# Patient Record
Sex: FEMALE | Race: WHITE | Employment: OTHER | URBAN - METROPOLITAN AREA
[De-identification: names, ages, dates, MRNs, and addresses within clinical notes are randomized per-mention and may not be internally consistent; named-entity substitution may affect disease eponyms.]

---

## 2017-01-09 ENCOUNTER — TELEPHONE ANTICOAG (OUTPATIENT)
Dept: CARDIOLOGY CLINIC | Age: 72
End: 2017-01-09

## 2017-01-09 DIAGNOSIS — Z79.01 LONG TERM CURRENT USE OF ANTICOAGULANT THERAPY: ICD-10-CM

## 2017-01-09 LAB — INR, EXTERNAL: 4.4

## 2017-01-09 NOTE — PROGRESS NOTES
Verbal order and read back per Dinora Talbot NP  The INR is above the therapeutic range. Ask the patient about bleeding complications. Please make the following adjustments to Coumadin dosing: Hold x 2 days then resume previous dosing schedule.  Recheck INR in 2 weeks   No answer, left instructions on voicemail and asked patient to call office to verify receipt of message. Andrzej Varela LPN      Message  Received:  Today       MANINDER Wilson LPN       Caller: Unspecified (Today, 12:44 PM)                      Hold x 2 days then resume previous dosing schedule.  Recheck INR in 2 weeks

## 2017-01-13 ENCOUNTER — HOSPITAL ENCOUNTER (OUTPATIENT)
Dept: LAB | Age: 72
Discharge: HOME | End: 2017-01-13
Payer: MEDICARE

## 2017-01-13 LAB
ALBUMIN SERPL BCP-MCNC: 3.8 G/DL (ref 3.4–5)
ALBUMIN/GLOB SERPL: 1.5 {RATIO} (ref 0.8–1.7)
ALP SERPL-CCNC: 65 U/L (ref 45–117)
ALT SERPL-CCNC: 27 U/L (ref 13–56)
ANION GAP BLD CALC-SCNC: 11 MMOL/L (ref 3–18)
AST SERPL W P-5'-P-CCNC: 17 U/L (ref 15–37)
BASOPHILS # BLD AUTO: 0 K/UL (ref 0–0.06)
BASOPHILS # BLD: 1 % (ref 0–2)
BILIRUB SERPL-MCNC: 0.6 MG/DL (ref 0.2–1)
BUN SERPL-MCNC: 17 MG/DL (ref 7–18)
BUN/CREAT SERPL: 18 (ref 12–20)
CALCIUM SERPL-MCNC: 9.2 MG/DL (ref 8.5–10.1)
CHLORIDE SERPL-SCNC: 104 MMOL/L (ref 100–108)
CHOLEST SERPL-MCNC: 154 MG/DL
CO2 SERPL-SCNC: 28 MMOL/L (ref 21–32)
CREAT SERPL-MCNC: 0.95 MG/DL (ref 0.6–1.3)
DIFFERENTIAL METHOD BLD: ABNORMAL
EOSINOPHIL # BLD: 0.1 K/UL (ref 0–0.4)
EOSINOPHIL NFR BLD: 3 % (ref 0–5)
ERYTHROCYTE [DISTWIDTH] IN BLOOD BY AUTOMATED COUNT: 13.7 % (ref 11.6–14.5)
GLOBULIN SER CALC-MCNC: 2.6 G/DL (ref 2–4)
GLUCOSE SERPL-MCNC: 98 MG/DL (ref 74–99)
HCT VFR BLD AUTO: 41.9 % (ref 35–45)
HDLC SERPL-MCNC: 55 MG/DL (ref 40–60)
HDLC SERPL: 2.8 {RATIO} (ref 0–5)
HGB BLD-MCNC: 13.6 G/DL (ref 12–16)
LDLC SERPL CALC-MCNC: 75 MG/DL (ref 0–100)
LIPID PROFILE,FLP: NORMAL
LYMPHOCYTES # BLD AUTO: 43 % (ref 21–52)
LYMPHOCYTES # BLD: 1.9 K/UL (ref 0.9–3.6)
MCH RBC QN AUTO: 30.1 PG (ref 24–34)
MCHC RBC AUTO-ENTMCNC: 32.5 G/DL (ref 31–37)
MCV RBC AUTO: 92.7 FL (ref 74–97)
MONOCYTES # BLD: 0.5 K/UL (ref 0.05–1.2)
MONOCYTES NFR BLD AUTO: 11 % (ref 3–10)
NEUTS SEG # BLD: 1.8 K/UL (ref 1.8–8)
NEUTS SEG NFR BLD AUTO: 42 % (ref 40–73)
PLATELET # BLD AUTO: 196 K/UL (ref 135–420)
PMV BLD AUTO: 11.4 FL (ref 9.2–11.8)
POTASSIUM SERPL-SCNC: 4.1 MMOL/L (ref 3.5–5.5)
PROT SERPL-MCNC: 6.4 G/DL (ref 6.4–8.2)
RBC # BLD AUTO: 4.52 M/UL (ref 4.2–5.3)
SODIUM SERPL-SCNC: 143 MMOL/L (ref 136–145)
TRIGL SERPL-MCNC: 120 MG/DL (ref ?–150)
VLDLC SERPL CALC-MCNC: 24 MG/DL
WBC # BLD AUTO: 4.3 K/UL (ref 4.6–13.2)

## 2017-01-13 PROCEDURE — 80061 LIPID PANEL: CPT | Performed by: FAMILY MEDICINE

## 2017-01-13 PROCEDURE — 36415 COLL VENOUS BLD VENIPUNCTURE: CPT | Performed by: FAMILY MEDICINE

## 2017-01-13 PROCEDURE — 80053 COMPREHEN METABOLIC PANEL: CPT | Performed by: FAMILY MEDICINE

## 2017-01-13 PROCEDURE — 85025 COMPLETE CBC W/AUTO DIFF WBC: CPT | Performed by: FAMILY MEDICINE

## 2017-01-23 LAB — INR, EXTERNAL: 2.3

## 2017-01-25 ENCOUNTER — TELEPHONE ANTICOAG (OUTPATIENT)
Dept: CARDIOLOGY CLINIC | Age: 72
End: 2017-01-25

## 2017-01-25 DIAGNOSIS — Z79.01 LONG TERM CURRENT USE OF ANTICOAGULANT THERAPY: ICD-10-CM

## 2017-01-25 NOTE — PROGRESS NOTES
Verbal order and read back per Sahara Santos NP  The INR is stable and therapeutic.  Continue same dose of coumadin and recheck in 2 weeks  Continue Coumadin to 2 mg daily except 1mg every other Mon  Patient informed of instructions, read back and verbalized understanding Hatch, LPN

## 2017-02-06 LAB — INR, EXTERNAL: 2.3

## 2017-02-06 RX ORDER — METOPROLOL SUCCINATE 25 MG/1
TABLET, EXTENDED RELEASE ORAL
Qty: 60 TAB | Refills: 6 | Status: SHIPPED | OUTPATIENT
Start: 2017-02-06 | End: 2017-05-13 | Stop reason: SDUPTHER

## 2017-02-07 ENCOUNTER — TELEPHONE ANTICOAG (OUTPATIENT)
Dept: CARDIOLOGY CLINIC | Age: 72
End: 2017-02-07

## 2017-02-07 DIAGNOSIS — Z79.01 LONG TERM CURRENT USE OF ANTICOAGULANT THERAPY: ICD-10-CM

## 2017-02-07 NOTE — PROGRESS NOTES
Verbal order and read back per Lakeisha Morel NP  The INR is stable and therapeutic. Continue same dose of coumadin and recheck in 2 weeks  Continue Coumadin 2 mg daily except 1 mg every other Mon  No answer, left instructions on voicemail and asked patient to call office to verify receipt of message.  Augusto Agudelo, LPN

## 2017-02-21 LAB — INR, EXTERNAL: 2.5

## 2017-02-23 ENCOUNTER — TELEPHONE ANTICOAG (OUTPATIENT)
Dept: CARDIOLOGY CLINIC | Age: 72
End: 2017-02-23

## 2017-02-23 DIAGNOSIS — Z79.01 LONG TERM CURRENT USE OF ANTICOAGULANT THERAPY: ICD-10-CM

## 2017-02-23 NOTE — PROGRESS NOTES
Verbal order and read back per Sunshine Curtis NP  The INR is stable and therapeutic. Continue same dose of coumadin and recheck in 2 weeks  Continue Coumadin 2 mg daily except 1 mg every other Mon  No answer, left instructions on voicemail and asked patient to call office to verify receipt of message.  Elena Bradford LPN

## 2017-03-06 LAB — INR, EXTERNAL: 2.4

## 2017-03-08 ENCOUNTER — TELEPHONE ANTICOAG (OUTPATIENT)
Dept: CARDIOLOGY CLINIC | Age: 72
End: 2017-03-08

## 2017-03-08 DIAGNOSIS — Z79.01 LONG TERM CURRENT USE OF ANTICOAGULANT THERAPY: ICD-10-CM

## 2017-03-10 ENCOUNTER — OFFICE VISIT (OUTPATIENT)
Dept: CARDIOLOGY CLINIC | Age: 72
End: 2017-03-10

## 2017-03-10 DIAGNOSIS — I11.9 BENIGN HYPERTENSIVE HEART DISEASE WITHOUT HEART FAILURE: ICD-10-CM

## 2017-03-10 DIAGNOSIS — E78.5 DYSLIPIDEMIA: ICD-10-CM

## 2017-03-10 DIAGNOSIS — Z95.1 S/P CABG X 1: ICD-10-CM

## 2017-03-10 DIAGNOSIS — Z79.01 LONG TERM CURRENT USE OF ANTICOAGULANT THERAPY: ICD-10-CM

## 2017-03-10 DIAGNOSIS — I25.10 ATHEROSCLEROSIS OF NATIVE CORONARY ARTERY OF NATIVE HEART WITHOUT ANGINA PECTORIS: Primary | ICD-10-CM

## 2017-03-10 NOTE — PROGRESS NOTES
Review of Systems   Constitutional: Positive for weight loss. Respiratory: Negative. Cardiovascular: Positive for palpitations. Gastrointestinal: Positive for constipation. Musculoskeletal: Positive for back pain, joint pain, myalgias and neck pain.

## 2017-03-10 NOTE — PROGRESS NOTES
1. Have you been to the ER, urgent care clinic since your last visit? Hospitalized since your last visit? no  2. Have you seen or consulted any other health care providers outside of the [address] [place] [address] since your last visit?   Include any pap smears or colon screening no

## 2017-03-10 NOTE — PROGRESS NOTES
Review of Systems   Constitutional: Positive for weight loss. Respiratory: Negative. Cardiovascular: Positive for chest pain and palpitations. Gastrointestinal: Positive for constipation. Musculoskeletal: Positive for back pain, joint pain, myalgias and neck pain.

## 2017-03-10 NOTE — MR AVS SNAPSHOT
Visit Information Date & Time Provider Department Dept. Phone Encounter #  
 3/10/2017 11:20 AM Serina Chacko DO [facility] [id number] Follow-up Instructions Return in about 6 months (around 9/10/2017), or if symptoms worsen or fail to improve. Upcoming Health Maintenance Date Due Hepatitis C Screening 1945 DTaP/Tdap/Td series (1 - Tdap) 4/27/1967 FOBT Q 1 YEAR AGE 50-75 4/27/1996 ZOSTER VACCINE AGE 60> 4/27/2006 GLAUCOMA SCREENING Q2Y 4/27/2011 Pneumococcal 65+ Low/Medium Risk (1 of 2 - PCV13) 4/27/2011 MEDICARE YEARLY EXAM 4/27/2011 INFLUENZA AGE 9 TO ADULT 8/1/2016 BREAST CANCER SCRN MAMMOGRAM 1/15/2018 Allergies as of 3/10/2017  Review Complete On: 3/10/2017 By: Serina Chacko DO Severity Noted Reaction Type Reactions Codeine High  Intolerance Nausea Only Crestor [Rosuvastatin]  07/16/2014    Myalgia Lipitor [Atorvastatin]  07/16/2014   Intolerance Myalgia High dose only Other Medication   Intolerance Other (comments) Unable to tolerate any pain medications due to severe GI upset Penicillins   Intolerance Rash And swelling Current Immunizations  Never Reviewed No immunizations on file. Not reviewed this visit You Were Diagnosed With   
  
 Codes Comments Atherosclerosis of native coronary artery of native heart without angina pectoris    -  Primary ICD-10-CM: I25.10 ICD-9-CM: 414.01 S/P CABG x 1     ICD-10-CM: Z95.1 ICD-9-CM: V45.81 Dyslipidemia     ICD-10-CM: E78.5 ICD-9-CM: 272.4 Benign hypertensive heart disease without heart failure     ICD-10-CM: I11.9 ICD-9-CM: 402.10 Vitals BP Pulse Height(growth percentile) Weight(growth percentile) SpO2 BMI  
 (!) 172/94 (!) 48 5' 6\" (1.676 m) 230 lb (104.3 kg) 97% 37.12 kg/m2 OB Status Smoking Status Postmenopausal Never Smoker Vitals History BMI and BSA Data Body Mass Index Body Surface Area  
 37.12 kg/m 2 2.2 m 2 Preferred Pharmacy Pharmacy Name Phone [facility] [address] [place] [facility] [phone] Your Updated Medication List  
  
   
This list is accurate as of: 3/10/17 12:52 PM.  Always use your most recent med list.  
  
  
  
  
 amitriptyline 10 mg tablet Commonly known as:  ELAVIL Take 10 mg by mouth nightly. LIPITOR 40 mg tablet Generic drug:  atorvastatin Take 40 mg by mouth daily. lisinopril-hydroCHLOROthiazide 10-12.5 mg per tablet Commonly known as:  Floyce Plenty Take 1 Tab by mouth daily. metoprolol succinate 25 mg XL tablet Commonly known as:  TOPROL XL  
TAKE ONE TABLET BY MOUTH TWICE DAILY PriLOSEC 20 mg capsule Generic drug:  omeprazole Take 20 mg by mouth daily. traMADol 50 mg tablet Commonly known as:  ULTRAM  
Take 50 mg by mouth as needed for Pain. VITAMIN C PO Take 1 Tab by mouth Three (3) times a week. VITAMIN D 2,000 unit Cap capsule Generic drug:  Cholecalciferol (Vitamin D3) Take 1 Tab by mouth daily. * warfarin 2 mg tablet Commonly known as:  COUMADIN Take 1 and 1/2 tablets by mouth once daily or as directed by physicain * warfarin 2 mg tablet Commonly known as:  COUMADIN  
TAKE 1 AND 1/2 TABLETS BY MOUTH ONCE DAILY OR AS DIRECTED BY PHYSICIAN. * Notice: This list has 2 medication(s) that are the same as other medications prescribed for you. Read the directions carefully, and ask your doctor or other care provider to review them with you. We Performed the Following AMB POC EKG ROUTINE W/ 12 LEADS, INTER & REP [90809 CPT(R)] Follow-up Instructions Return in about 6 months (around 9/10/2017), or if symptoms worsen or fail to improve. To-Do List   
 03/10/2017 ECHO:  ECHO TTE STRESS EXERCISE TREADMILL COMP   
  
 03/10/2017 ECHO:  ECHO TTE STRESS EXRCSE COMP W OR WO CONTR   
  
 03/14/2017 2:30 PM  
  Appointment with [facility] NUC CARD ROOM; [facility]- IE33 MACHINE (WT ) at [facility] NON-INVASIVE CARD ([phone]) Age Limit for ALL Heart procedures @ all [facility] facilities: 18 yrs and older only. Under the age of 25, refer to [facility] ([phone]). PATIENTS SHOULD NOT BRING CHILDREN UNATTENDED TO APPTS. WEIGHT LIMIT:  300 lbs for the treadmill portion of this study. 1-NPO and no caffeine for 4 hours prior to the test 2-No beta blockers or calcium channel blockers day of the test unless specified by cardiology. Please bring with. 3-Patient will be walking/running on a Treadmill. Patient should wear comfortable shoes that are suitable for walking (NO Sandals/Flip Flops, High Heels, etc.) & comfortable clothing. Please report to the [facility] @ [facility], [address] [place] [address], [address], [employer], [place]. Introducing [facility] & HEALTH SERVICES! Dhruv Dotson introduces [employer] patient portal. Now you can access parts of your medical record, email your doctor's office, and request medication refills online. 1. In your internet browser, go to https://[employer]. [employer] 2. Click on the First Time User? Click Here link in the Sign In box. You will see the New Member Sign Up page. 3. Enter your [employer] Access Code exactly as it appears below. You will not need to use this code after youve completed the sign-up process. If you do not sign up before the expiration date, you must request a new code. · [employer] Access Code: [email or web address] Expires: 6/6/2017  1:34 PM 
 
4. Enter the last four digits of your Social Security Number (xxxx) and Date of Birth (mm/dd/yyyy) as indicated and click Submit. You will be taken to the next sign-up page. 5. Create a [employer] ID. This will be your [employer] login ID and cannot be changed, so think of one that is secure and easy to remember. 6. Create a [employer] password. You can change your password at any time. 7. Enter your Password Reset Question and Answer. This can be used at a later time if you forget your password. 8. Enter your e-mail address. You will receive e-mail notification when new information is available in [address] 9. Click Sign Up. You can now view and download portions of your medical record. 10. Click the Download Summary menu link to download a portable copy of your medical information. If you have questions, please visit the Frequently Asked Questions section of the [employer] website. Remember, [employer] is NOT to be used for urgent needs. For medical emergencies, dial 911. Now available from your iPhone and Android! Please provide this summary of care documentation to your next provider. Your primary care clinician is listed as JOSE CARLOS GEORGE. If you have any questions after today's visit, please call [phone].

## 2017-03-10 NOTE — PROGRESS NOTES
HPI: I saw Serge Oksana Gaona in my office today in cardiovascular evaluation regarding her chronic coronary artery disease. Ms. Beena Gaona is a very pleasant 80year old white female with history of coronary artery disease, status post coronary artery bypass grafting surgery with left internal mammary artery to the LAD done as a midcab procedure after a failed angioplasty in 1996. She has done reasonably well on medical therapy over the years. Her last screening nuclear myocardial perfusion study in July of 2014 demonstrated some distal inferolateral and apical akinesia, but still fairly normal overall left ventricular function with ejection fraction of 62% and no signs of any ongoing ischemia. She comes into the office today and relates that she is doing reasonably well. She does have some sharp left-sided chest discomfort which she has at various times and intermittently with exertion that is the same as it has been for many years. This has been felt to be non-cardiac or due to atypical angina with the pattern of the discomforts been quite stable for a number of years. She is also complaining of occasional palpitations which have not changed in frequency and denies any other cardiovascular symptomatology. Encounter Diagnoses   Name Primary?  Atherosclerosis of native coronary artery of native heart without angina pectoris Yes    S/P CABG x 1 in 1996     Dyslipidemia     Hypertensive heart disease      Long term current use of anticoagulant therapy        Discussion: This lady appears to be doing about as well as we could expect and really have no recommendations for change at this time. The pain that she has been describing she has had almost since her open heart surgery and has not been felt to be anginal and is unchanged. She otherwise seems to be doing reasonably well and I do not feel we need to repeat her pharmacologic myocardial perfusion study at this time.     Her latest lipid profile which was completed on January 13, 2017 demonstrated total cholesterol 154 with triglycerides of 120, HDL 55, LDL 75, and VLDL of 24 which I think is reasonable control on her Lipitor 40 mg daily. A case could be made for more aggressive treatment with Lipitor, but this was completed right around the holiday season and a rather check her level in July and then decide on adjustment of her medications at that time if it is still shows an LDL above 70. Her blood pressure is elevated today and I rechecked it myself and got 175/95 which was quite similar to the blood pressure obtained by my staff initially. She currently is only on Toprol, lisinopril and hydrochlorothiazide and I have recommended that she check on her pressure either at home or through Dr. Francheska Manning office for further adjustment of her medications moving forward to get her blood pressure adequately controlled under 835 systolic. Since she is otherwise doing well I will plan to see her again in several months or as needed if new cardiovascular symptoms surface in the interim. PCP: Kindra Carl MD       Past Medical History:   Diagnosis Date    Abnormal myocardial perfusion study 07/07/2014    Mid to distal inferolateral & apical fixed defect c/w prior infarction. No ongoing ischemia. EF 62%. Distal inferolateral & apical akin. Rate-dependent LBBB & occasional PAC & PVCs w/exercise. Ex time 3 min 46 sec.        Arthritis     Broken wrist     fractured elbow also    Crush injury 9/23/09    fall w/ secondary crush injury to a portion of the elbow w/ subsequent surgery; frozen shoulder     CVA (cerebral vascular accident) ([address]     hx of 2 small CVAs    Disc disease, degenerative, cervical     Heart abnormality     Heart disease     atherosclerotic; s/p MIDCAB in 1996 w/ a lt internal mammary artery to the LAD, which was patent and nuclear study in 2007, demonstrating some apical scar w/o ischemia, for medical therapy    History of cardiac cath 06/17/2004    Small RCA - mild. LM - mild. LAD - large aneurysmal segment. Mild LAD and D2. LIMA ok. EF 45-50%. Anterolateral/apical hyk to dysk.  History of echocardiogram 07/17/2015    EF 50-55%. Apical dysk. Mild LVH  Gr 1 DDfx. No significant valvular heart disease.  Hypercholesterolemia     Hypertension     Hypertensive cardiovascular disease     LBP (low back pain)     Lower extremity venous duplex 07/01/2004    No DVT bilaterally. Patent R SFA w/o pseudoaneurysm.  Musculoskeletal chest pain     Reflux     S/P CABG x 1 09/14/96    LIMA-LAD     Stroke [facility]) 3001/0197         Past Surgical History:   Procedure Laterality Date    CARDIAC SURG PROCEDURE UNLIST      open heart surgery    HX CORONARY ARTERY BYPASS GRAFT  1996    lt internal mammary artery to lt anterior descending after a failed angioplasty    HX GYN      removal of left ovarian tumor four years ago    HX ORTHOPAEDIC  9/08    pinning of fracture in rt forearm    HX OTHER SURGICAL      bilateral TMJ surgery    HX OTHER SURGICAL  10/31/15    vagina biopsy of lesions         Current Outpatient Rx   Name  Route  Sig  Dispense  Refill    warfarin (COUMADIN) 2 mg tablet        Take 1 and 1/2 tablets by mouth once daily or as directed by physicain    45 Tab    5      metoprolol succinate (TOPROL XL) 25 mg XL tablet    Oral    Take 1 Tab by mouth two (2) times a day. 60 Tab    6      ezetimibe (ZETIA) 10 mg tablet    Oral    Take 10 mg by mouth daily.  traMADol (ULTRAM) 50 mg tablet    Oral    Take 50 mg by mouth as needed for Pain.  amitriptyline (ELAVIL) 10 mg tablet    Oral    Take 10 mg by mouth nightly.  atorvastatin (LIPITOR) 40 mg tablet    Oral    Take 40 mg by mouth daily.  lisinopril-hydrochlorothiazide (PRINZIDE, ZESTORETIC) 10-12.5 mg per tablet    Oral    Take 1 Tab by mouth daily.                 Cholecalciferol, Vitamin D3, (VITAMIN D) 2,000 unit Cap    Oral    Take 1 Tab by mouth daily.  omeprazole (PRILOSEC) 20 mg capsule    Oral    Take 20 mg by mouth daily.  ASCORBATE CALCIUM (VITAMIN C PO)    Oral    Take 1 Tab by mouth Three (3) times a week. Allergies   Allergen Reactions    Codeine Nausea Only    Crestor [Rosuvastatin] Myalgia    Lipitor [Atorvastatin] Myalgia     High dose only    Other Medication Other (comments)     Unable to tolerate any pain medications due to severe GI upset    Penicillins Rash     And swelling         Social   Social History   Substance Use Topics    Smoking status: Never Smoker    Smokeless tobacco: Never Used    Alcohol use No         Family history: family history includes Cancer in her father and mother. Review of Systems:    Constitutional: Positive for weight loss. Respiratory: Negative. Cardiovascular: Positive for chest pain and palpitations. Gastrointestinal: Positive for constipation. Musculoskeletal: Positive for back pain, joint pain, myalgias and neck pain. Physical Exam:   The patient is an alert, oriented, well developed, well nourished 80 y.o.  female who was in no acute distress at the time of my examination. Visit Vitals    BP (!) 175/95    Pulse (!) 48    Ht 5' 6\" (1.676 m)    Wt 104.3 kg (230 lb)    SpO2 97%    BMI 37.12 kg/m2      BP Readings from Last 3 Encounters:   03/13/17 (!) 175/95   09/01/16 130/74   02/24/16 118/72        Wt Readings from Last 3 Encounters:   03/10/17 104.3 kg (230 lb)   09/01/16 107 kg (236 lb)   02/24/16 104.3 kg (230 lb)       HEENT: Conjuctiva white, mucosa moist, no pallor or cyanosis. Neck: Supple without masses, tenderness or thyromegaly. No jugular venous distention. Carotid upstrokes are full bilaterally, without bruits. Cardiovascular: Chest is symmetrical with good excursion. Pendulous breasts.   [place] is not displaced with a discreetormal, without appreciable murmurs, rubs, clicks or gallops. Lungs: Clear to auscultation in all fields. Abdomen: Soft. No masses, tenderness or organomegaly. Extremities: Mild edema in her ankles with full peripheral pulses. Review of Data: Please refer to past medical history for most recent cardiac testing. Lab Results   Component Value Date/Time    Cholesterol, total 154 01/13/2017 11:25 AM    HDL Cholesterol 55 01/13/2017 11:25 AM    LDL, calculated 75 01/13/2017 11:25 AM    Triglyceride 120 01/13/2017 11:25 AM    CHOL/HDL Ratio 2.8 01/13/2017 11:25 AM       Results for orders placed or performed in visit on 03/10/17   AMB POC EKG ROUTINE W/ 12 LEADS, INTER & REP     Status: None    Narrative    Sinus bradycardia, rate 48. First-degree AV block. Possible old anterior infarction with questionable Q waves in V1 through V3. Diffuse ST-T flattening with some mild T-wave inversions anteriorly which could suggest ischemia. Compared to the EKG of September 1, 2016 there was no significant change of the deep Q-wave in lead V3 is new and may potentially be related to lead placement variation. Vane Melton D.O., F.A.C.C. [facility]  [facility] [place]  [address] [place] [address]. [address] [facility]  [phone]     PLEASE NOTE:  This document has been produced using voice recognition software. Unrecognized errors in transcription may be present.

## 2017-03-13 VITALS
HEIGHT: 66 IN | DIASTOLIC BLOOD PRESSURE: 95 MMHG | BODY MASS INDEX: 36.96 KG/M2 | SYSTOLIC BLOOD PRESSURE: 175 MMHG | HEART RATE: 48 BPM | WEIGHT: 230 LBS | OXYGEN SATURATION: 97 %

## 2017-03-14 ENCOUNTER — HOSPITAL ENCOUNTER (OUTPATIENT)
Dept: NON INVASIVE DIAGNOSTICS | Age: 72
Discharge: HOME | End: 2017-03-14
Attending: INTERNAL MEDICINE

## 2017-03-14 DIAGNOSIS — I25.10 ATHEROSCLEROSIS OF NATIVE CORONARY ARTERY OF NATIVE HEART WITHOUT ANGINA PECTORIS: ICD-10-CM

## 2017-03-14 DIAGNOSIS — Z95.1 S/P CABG X 1: ICD-10-CM

## 2017-03-14 DIAGNOSIS — I25.10 ATHEROSCLEROSIS OF NATIVE CORONARY ARTERY OF NATIVE HEART WITHOUT ANGINA PECTORIS: Primary | ICD-10-CM

## 2017-03-15 ENCOUNTER — HOSPITAL ENCOUNTER (OUTPATIENT)
Dept: NON INVASIVE DIAGNOSTICS | Age: 72
Discharge: HOME | End: 2017-03-15
Attending: INTERNAL MEDICINE
Payer: MEDICARE

## 2017-03-15 DIAGNOSIS — I25.10 ATHEROSCLEROSIS OF NATIVE CORONARY ARTERY OF NATIVE HEART WITHOUT ANGINA PECTORIS: ICD-10-CM

## 2017-03-15 DIAGNOSIS — Z95.1 S/P CABG X 1: ICD-10-CM

## 2017-03-15 LAB
ATTENDING PHYSICIAN, CST07: NORMAL
DIAGNOSIS, 93000: NORMAL
DUKE TM SCORE RESULT, CST14: NORMAL
DUKE TREADMILL SCORE, CST13: NORMAL
ECG INTERP BEFORE EX, CST11: NORMAL
ECG INTERP DURING EX, CST12: NORMAL
FUNCTIONAL CAPACITY, CST17: NORMAL
KNOWN CARDIAC CONDITION, CST08: NORMAL
MAX. DIASTOLIC BP, CST04: 90 MMHG
MAX. HEART RATE, CST05: 114 BPM
MAX. SYSTOLIC BP, CST03: 160 MMHG
OVERALL BP RESPONSE TO EXERCISE, CST16: NORMAL
OVERALL HR RESPONSE TO EXERCISE, CST15: NORMAL
PEAK EX METS, CST10: 1 METS
PROTOCOL NAME, CST01: NORMAL
TEST INDICATION, CST09: NORMAL

## 2017-03-15 PROCEDURE — 93017 CV STRESS TEST TRACING ONLY: CPT | Performed by: INTERNAL MEDICINE

## 2017-03-15 PROCEDURE — 74011250636 HC RX REV CODE- 250/636: Performed by: INTERNAL MEDICINE

## 2017-03-15 PROCEDURE — 78452 HT MUSCLE IMAGE SPECT MULT: CPT | Performed by: INTERNAL MEDICINE

## 2017-03-15 PROCEDURE — A9500 TC99M SESTAMIBI: HCPCS

## 2017-03-15 RX ORDER — SODIUM CHLORIDE 0.9 % (FLUSH) 0.9 %
10 SYRINGE (ML) INJECTION AS NEEDED
Status: COMPLETED | OUTPATIENT
Start: 2017-03-15 | End: 2017-03-15

## 2017-03-15 RX ADMIN — Medication 10 ML: at 08:30

## 2017-03-15 RX ADMIN — REGADENOSON 0.4 MG: 0.08 INJECTION, SOLUTION INTRAVENOUS at 08:30

## 2017-03-15 RX ADMIN — Medication 10 ML: at 07:10

## 2017-03-15 NOTE — PROGRESS NOTES
Patient was injected with 65.7 millicuries 34QUR Sestamibi on 3/15/17 at 0710. Patient was injected with 60.9 millicuries 13CVK Sestamibi on  3/15/17 at 0830. Patient's armbands were removed and placed in shred-it box.     Patient had a Nuclear Lexiscan Stress Test.

## 2017-03-20 LAB — INR, EXTERNAL: 2.3

## 2017-03-21 NOTE — PROCEDURES
[address] [facility] CARDIAC STRESS    Name:  Naa Akers  MR#:  [id number]  :  1945  Account #:  [de-identified]  Date of Adm:  03/15/2017  Date of Service:      ORDERING PHYSICIAN: Dr. Anshul Bonner    INDICATIONS: Coronary artery disease, history of bypass surgery. Resting heart rate 68, blood pressure 160/88. Resting EKG shows  sinus rhythm with a left bundle branch block. PHARMACOLOGIC STRESS PORTION: The patient underwent  Lexiscan infusion 0.4 mg intravenously per protocol via the left arm IV  and then sat still because of the bundle branch block. The patient remained in bundle branch block, making  this a non-diagnostic EKG portion of the stress test.    PERFUSION IMAGING: The patient received intravenous technetium-  99m sestamibi 11.0 mCi via the left hand IV for resting images and  then 33.0 mCi via the same IV an hour later for stress images. Tomographic views of the left ventricle obtained and compared. Cavity  size was normal with both rest and stress imaging. There was a  medium size, primarily fixed perfusion defect involving the majority of  the left ventricular apex, which is more prominent on the inferior portion  of the apex and also the distal portion of the inferolateral wall,  consistent with prior myocardial infarction. There were no reversible  perfusion imaging abnormalities concerning for myocardial ischemia. Gated analysis demonstrates normal LV size, mildly depressed left  ventricular systolic function, ejection fraction calculated at 46% with  akinesis of the inferior portion of the apex and also the distal  inferolateral wall. There was mild septal wall dyskinesis likely from  underlying bundle branch block and previous sternotomy. CONCLUSIONS:  1.  Abnormal, intermediate risk pharmacologic nuclear stress test.  2. Medium-sized fixed defect involving the majority of the left  ventricular apex and distal inferolateral wall consistent with  prior myocardial infarction. 3. There are no reversible perfusion defects concerning for  myocardial ischemia. 4. Normal left ventricular size, mildly depressed left ventricular systolic  function, ejection fraction calculated at 46% with above-mentioned  regional wall motion abnormalities. 5. Compared to previous study date 07/07/2014, the perfusion pattern  appears unchanged, the ejection fraction has decreased from 62% to  now 46%.         MD BOB Wayne / Effie Power  D:  03/21/2017   07:39  T:  03/21/2017   10:20  Job #:  [id number]

## 2017-03-22 RX ORDER — WARFARIN 2 MG/1
TABLET ORAL
Qty: 45 TAB | Refills: 6 | Status: SHIPPED | OUTPATIENT
Start: 2017-03-22 | End: 2017-09-27 | Stop reason: SDUPTHER

## 2017-03-23 ENCOUNTER — TELEPHONE ANTICOAG (OUTPATIENT)
Dept: CARDIOLOGY CLINIC | Age: 72
End: 2017-03-23

## 2017-03-23 DIAGNOSIS — Z79.01 LONG TERM CURRENT USE OF ANTICOAGULANT THERAPY: ICD-10-CM

## 2017-03-23 NOTE — PROGRESS NOTES
Verbal order and read back per Cecilia Scott, DO  The INR is stable and therapeutic. Continue same dose of coumadin and recheck in 2 weeks  Continue Coumadin 2 mg daily except 1 mg every other Mon  No answer, left instructions on voicemail and asked patient to call office to verify receipt of message.  Charlotte Luo LPN

## 2017-03-27 ENCOUNTER — TELEPHONE (OUTPATIENT)
Dept: CARDIOLOGY CLINIC | Age: 72
End: 2017-03-27

## 2017-03-27 NOTE — TELEPHONE ENCOUNTER
----- Message from Herlinda Dakin, RN sent at 3/22/2017 11:21 AM EDT -----  Think this was ordered to clear her for surgery.

## 2017-03-27 NOTE — TELEPHONE ENCOUNTER
I called and discussed the results of the study with the patient. Her nuclear myocardial perfusion study simply demonstrated an apical scar which she has had for many years and was documented a cardiac catheterization by Dr. Anthony Lynch back in 2004. Her overall left ventricular function was estimated at 46% and this was similar to what Dr. Anthony Lynch had suggested on left ventriculography with ejection fraction of 45-50%. She did have a nuclear myocardial fissure perfusion study in 2014 suggesting ejection fraction of 62%, but that was done with the patient having a lot of PACs and I think that caused a falsely increased ejection fraction. Consequently, everything seems to be stable and she is to continue her medical therapy as currently ordered.  ES

## 2017-04-03 LAB — INR, EXTERNAL: 2.6

## 2017-04-06 ENCOUNTER — TELEPHONE ANTICOAG (OUTPATIENT)
Dept: CARDIOLOGY CLINIC | Age: 72
End: 2017-04-06

## 2017-04-06 DIAGNOSIS — Z79.01 LONG TERM CURRENT USE OF ANTICOAGULANT THERAPY: ICD-10-CM

## 2017-04-14 ENCOUNTER — TELEPHONE ANTICOAG (OUTPATIENT)
Dept: CARDIOLOGY CLINIC | Age: 72
End: 2017-04-14

## 2017-04-14 DIAGNOSIS — Z79.01 LONG TERM CURRENT USE OF ANTICOAGULANT THERAPY: ICD-10-CM

## 2017-04-17 LAB — INR, EXTERNAL: 2.3

## 2017-04-18 ENCOUNTER — TELEPHONE (OUTPATIENT)
Dept: CARDIOLOGY CLINIC | Age: 72
End: 2017-04-18

## 2017-04-18 NOTE — TELEPHONE ENCOUNTER
Verbal order and read back per Sol Restrepo, DO  Pt is moderate risk for Release of Left 1st Dorsal Extensor Compartment with Dr. Dougherty Flair on 5/1/2017 bridge with Lovenox      Hold coumadin starting on 4/26/2017 start Lovenox on 4/28/2017 (8am and 8pm) BID, no Lovenox 12 hr prior to the procedure, restart Lovenox after procedure, restart coumadin the day after the procedure 4mg then back to normal schedule and INR in one week.

## 2017-04-19 ENCOUNTER — TELEPHONE ANTICOAG (OUTPATIENT)
Dept: CARDIOLOGY CLINIC | Age: 72
End: 2017-04-19

## 2017-04-19 DIAGNOSIS — Z79.01 LONG TERM CURRENT USE OF ANTICOAGULANT THERAPY: ICD-10-CM

## 2017-04-19 NOTE — PROGRESS NOTES
Verbal order and read back per Izzy Valdez NP  The INR is stable and therapeutic. Continue same dose of coumadin and recheck in 2 weeks  Continue Coumadin 2 mg daily except 1 mg every other Mon   Patient informed of instructions, read back and verbalized understanding Vinny Allen LPN    Patient states she rescheduled procedure for June 7th. Encounter dated 4/18/17 stated patient is cleared for surgery but will bridge with Lovenox.   Ardyth Edge, LPN

## 2017-05-01 LAB — INR, EXTERNAL: 2.5

## 2017-05-02 ENCOUNTER — TELEPHONE ANTICOAG (OUTPATIENT)
Dept: CARDIOLOGY CLINIC | Age: 72
End: 2017-05-02

## 2017-05-02 DIAGNOSIS — Z79.01 LONG TERM CURRENT USE OF ANTICOAGULANT THERAPY: ICD-10-CM

## 2017-05-02 NOTE — PROGRESS NOTES
Verbal order and read back per Trini Vanegas NP  The INR is stable and therapeutic.  Continue same dose of coumadin and recheck in 2 weeks  Continue Coumadin 2 mg daily except 1 mg every other Mon   Patient informed of instructions, read back and verbalized understanding Mariola Laurent LPN

## 2017-05-15 RX ORDER — METOPROLOL SUCCINATE 25 MG/1
TABLET, EXTENDED RELEASE ORAL
Qty: 60 TAB | Refills: 5 | Status: SHIPPED | OUTPATIENT
Start: 2017-05-15 | End: 2018-01-13 | Stop reason: SDUPTHER

## 2017-05-16 ENCOUNTER — TELEPHONE ANTICOAG (OUTPATIENT)
Dept: CARDIOLOGY CLINIC | Age: 72
End: 2017-05-16

## 2017-05-16 DIAGNOSIS — Z79.01 LONG TERM CURRENT USE OF ANTICOAGULANT THERAPY: ICD-10-CM

## 2017-05-16 LAB — INR, EXTERNAL: 3.9

## 2017-05-16 NOTE — PROGRESS NOTES
Verbal order and read back per Mirela Padilla NP  The INR is above the therapeutic range. Ask the patient about bleeding complications.   Please make the following adjustments to Coumadin dosing: Hold X 1, 1 mg Wed and Fri while on abx then Continue Coumadin 2 mg daily except 1 mg every other Mon   Repeat the INR in 1 week  Patient informed of instructions, read back and verbalized understanding Harvinder Jha LPN

## 2017-05-23 ENCOUNTER — TELEPHONE ANTICOAG (OUTPATIENT)
Dept: CARDIOLOGY CLINIC | Age: 72
End: 2017-05-23

## 2017-05-23 DIAGNOSIS — Z79.01 LONG TERM CURRENT USE OF ANTICOAGULANT THERAPY: ICD-10-CM

## 2017-05-23 LAB — INR, EXTERNAL: 3.5

## 2017-05-23 NOTE — PROGRESS NOTES
Verbal order and read back per Luca Hudson NP  The INR is above the therapeutic range. Ask the patient about bleeding complications. Please make the following adjustments to Coumadin dosing: Hold X 1, 1 mg X 2 then Continue Coumadin 2 mg daily except 1 mg every other Mon   Repeat the INR in 1 week.   Patient informed of instructions, read back and verbalized understanding Karen Joshi LPN

## 2017-06-12 ENCOUNTER — HOSPITAL ENCOUNTER (OUTPATIENT)
Dept: GENERAL RADIOLOGY | Age: 72
Discharge: HOME | End: 2017-06-12
Payer: MEDICARE

## 2017-06-12 DIAGNOSIS — M54.6 PAIN IN THORACIC SPINE: ICD-10-CM

## 2017-06-12 DIAGNOSIS — M54.2 CERVICALGIA: ICD-10-CM

## 2017-06-12 LAB — INR, EXTERNAL: 3.1

## 2017-06-12 PROCEDURE — 72070 X-RAY EXAM THORAC SPINE 2VWS: CPT

## 2017-06-12 PROCEDURE — 72050 X-RAY EXAM NECK SPINE 4/5VWS: CPT

## 2017-06-13 ENCOUNTER — TELEPHONE ANTICOAG (OUTPATIENT)
Dept: CARDIOLOGY CLINIC | Age: 72
End: 2017-06-13

## 2017-06-13 DIAGNOSIS — Z79.01 LONG TERM CURRENT USE OF ANTICOAGULANT THERAPY: ICD-10-CM

## 2017-06-13 NOTE — PROGRESS NOTES
Verbal order and read back per Shae Middleton NP  The INR is slightly above the therapeutic range. Ask the patient about bleeding complications. Please make the following adjustments to Coumadin dosing: Change Coumadin to 2 mg daily except 1 mg on Mon   Repeat the INR in 2 weeks.   Patient informed of instructions, read back and verbalized understanding Anthony Lucero LPN

## 2017-06-19 LAB — INR, EXTERNAL: 2.7

## 2017-06-22 ENCOUNTER — TELEPHONE ANTICOAG (OUTPATIENT)
Dept: CARDIOLOGY CLINIC | Age: 72
End: 2017-06-22

## 2017-06-22 DIAGNOSIS — Z79.01 LONG TERM CURRENT USE OF ANTICOAGULANT THERAPY: ICD-10-CM

## 2017-07-05 LAB — INR, EXTERNAL: 2.5

## 2017-07-07 ENCOUNTER — TELEPHONE ANTICOAG (OUTPATIENT)
Dept: CARDIOLOGY CLINIC | Age: 72
End: 2017-07-07

## 2017-07-07 DIAGNOSIS — Z79.01 LONG TERM CURRENT USE OF ANTICOAGULANT THERAPY: ICD-10-CM

## 2017-07-07 NOTE — PROGRESS NOTES
Verbal order and read back per Sofia Sawyer NP  The INR is stable and therapeutic. Continue same dose of coumadin and recheck in 2 weeks  Continue Coumadin 2 mg daily except 1 mg on Mon   No answer, left instructions on voicemail and asked patient to call office to verify receipt of message.  Tanner Crockett LPN

## 2017-07-18 LAB — INR, EXTERNAL: 2.8

## 2017-07-31 ENCOUNTER — HOSPITAL ENCOUNTER (OUTPATIENT)
Dept: MAMMOGRAPHY | Age: 72
Discharge: HOME | End: 2017-07-31
Attending: SPECIALIST
Payer: MEDICARE

## 2017-07-31 DIAGNOSIS — Z12.31 VISIT FOR SCREENING MAMMOGRAM: ICD-10-CM

## 2017-07-31 PROCEDURE — 77063 BREAST TOMOSYNTHESIS BI: CPT

## 2017-08-02 LAB — INR, EXTERNAL: 2.2

## 2017-08-03 ENCOUNTER — TELEPHONE ANTICOAG (OUTPATIENT)
Dept: CARDIOLOGY CLINIC | Age: 72
End: 2017-08-03

## 2017-08-03 DIAGNOSIS — Z79.01 LONG TERM CURRENT USE OF ANTICOAGULANT THERAPY: ICD-10-CM

## 2017-08-03 NOTE — PROGRESS NOTES
Verbal order and read back per Courtney Suarez NP  The INR is stable and therapeutic.  Continue same dose of coumadin and recheck in 2 weeks  Continue Coumadin 2 mg daily except 1 mg on Mon   Patient informed of instructions, read back and verbalized understanding Onelia Chan LPN

## 2017-08-17 LAB — INR, EXTERNAL: 2.3

## 2017-08-18 ENCOUNTER — TELEPHONE ANTICOAG (OUTPATIENT)
Dept: CARDIOLOGY CLINIC | Age: 72
End: 2017-08-18

## 2017-08-18 DIAGNOSIS — Z79.01 LONG TERM CURRENT USE OF ANTICOAGULANT THERAPY: ICD-10-CM

## 2017-08-18 NOTE — PROGRESS NOTES
Verbal order and read back per Oj Ramirez MD  The INR is stable and therapeutic. Continue same dose of coumadin and recheck in 2 weeks  Continue Coumadin 2 mg daily except 1 mg on Mon   No answer, left instructions on voicemail and asked patient to call office to verify receipt of message.  Jaycob Dacosta LPN

## 2017-09-02 LAB — INR, EXTERNAL: 3.5

## 2017-09-05 ENCOUNTER — TELEPHONE ANTICOAG (OUTPATIENT)
Dept: CARDIOLOGY CLINIC | Age: 72
End: 2017-09-05

## 2017-09-05 DIAGNOSIS — Z79.01 LONG TERM CURRENT USE OF ANTICOAGULANT THERAPY: ICD-10-CM

## 2017-09-05 RX ORDER — KRILL/OM-3/DHA/EPA/PHOSPHO/AST 1000-230MG
CAPSULE ORAL
COMMUNITY
End: 2018-03-09

## 2017-09-05 NOTE — PROGRESS NOTES
Verbal order and read back per Mable Hughes NP  The INR is above the therapeutic range. Ask the patient about bleeding complications. Please make the following adjustments to Coumadin dosing: Hold X 1 then Continue Coumadin 2 mg daily except 1 mg on Mon   Repeat the INR in 2 weeks.   No answer, left instructions on voicemail and asked patient to call office to verify receipt of message and any changes in medication, diet, etc. Lesly Bland LPN

## 2017-09-27 ENCOUNTER — OFFICE VISIT (OUTPATIENT)
Dept: CARDIOLOGY CLINIC | Age: 72
End: 2017-09-27

## 2017-09-27 VITALS
SYSTOLIC BLOOD PRESSURE: 124 MMHG | BODY MASS INDEX: 37.61 KG/M2 | WEIGHT: 234 LBS | HEART RATE: 59 BPM | DIASTOLIC BLOOD PRESSURE: 82 MMHG | HEIGHT: 66 IN | OXYGEN SATURATION: 97 %

## 2017-09-27 DIAGNOSIS — Z95.1 S/P CABG X 1: ICD-10-CM

## 2017-09-27 DIAGNOSIS — I25.10 ATHEROSCLEROSIS OF NATIVE CORONARY ARTERY OF NATIVE HEART WITHOUT ANGINA PECTORIS: Primary | ICD-10-CM

## 2017-09-27 DIAGNOSIS — E78.5 DYSLIPIDEMIA: ICD-10-CM

## 2017-09-27 DIAGNOSIS — I11.9 BENIGN HYPERTENSIVE HEART DISEASE WITHOUT HEART FAILURE: ICD-10-CM

## 2017-09-27 DIAGNOSIS — I44.7 LBBB (LEFT BUNDLE BRANCH BLOCK): ICD-10-CM

## 2017-09-27 NOTE — MR AVS SNAPSHOT
Visit Information Date & Time Provider Department Dept. Phone Encounter #  
 9/27/2017 11:00 AM Rita Nick DO [facility] [id number] Follow-up Instructions Return in about 6 months (around 3/27/2018), or if symptoms worsen or fail to improve. Your Appointments 4/16/2018 11:20 AM  
Follow Up with Rita Nick DO [facility] ([facility]) Appt Note: 6 month f/up [place] [id number] [address] [address] [phone] [address] [place] [address] [address] Upcoming Health Maintenance Date Due Hepatitis C Screening 1945 DTaP/Tdap/Td series (1 - Tdap) 4/27/1967 FOBT Q 1 YEAR AGE 50-75 4/27/1996 ZOSTER VACCINE AGE 60> 2/27/2006 GLAUCOMA SCREENING Q2Y 4/27/2011 Pneumococcal 65+ Low/Medium Risk (1 of 2 - PCV13) 4/27/2011 MEDICARE YEARLY EXAM 4/27/2011 INFLUENZA AGE 9 TO ADULT 8/1/2017 BREAST CANCER SCRN MAMMOGRAM 7/31/2019 Allergies as of 9/27/2017  Review Complete On: 9/27/2017 By: Rita Nick DO Severity Noted Reaction Type Reactions Codeine High  Intolerance Nausea Only Crestor [Rosuvastatin]  07/16/2014    Myalgia Lipitor [Atorvastatin]  07/16/2014   Intolerance Myalgia High dose only Other Medication   Intolerance Other (comments) Unable to tolerate any pain medications due to severe GI upset Penicillins   Intolerance Rash And swelling Current Immunizations  Never Reviewed No immunizations on file. Not reviewed this visit You Were Diagnosed With   
  
 Codes Comments Atherosclerosis of native coronary artery of native heart without angina pectoris    -  Primary ICD-10-CM: I25.10 ICD-9-CM: 414.01 S/P CABG x 1     ICD-10-CM: Z95.1 ICD-9-CM: V45.81 Dyslipidemia     ICD-10-CM: E78.5 ICD-9-CM: 272.4 Benign hypertensive heart disease without heart failure     ICD-10-CM: I11.9 ICD-9-CM: 402.10 LBBB (left bundle branch block)     ICD-10-CM: I44.7 ICD-9-CM: 426. 3 Vitals BP Pulse Height(growth percentile) Weight(growth percentile) SpO2 BMI  
 124/82 (!) 59 5' 6\" (1.676 m) 234 lb (106.1 kg) 97% 37.77 kg/m2 OB Status Smoking Status Postmenopausal Never Smoker BMI and BSA Data Body Mass Index Body Surface Area  
 37.77 kg/m 2 2.22 m 2 Preferred Pharmacy Pharmacy Name Phone [facility] [address] [place] [facility] [phone] Your Updated Medication List  
  
   
This list is accurate as of: 9/27/17 12:23 PM.  Always use your most recent med list.  
  
  
  
  
 amitriptyline 10 mg tablet Commonly known as:  ELAVIL Take 10 mg by mouth nightly. LIPITOR 40 mg tablet Generic drug:  atorvastatin Take 40 mg by mouth daily. lisinopril-hydroCHLOROthiazide 10-12.5 mg per tablet Commonly known as:  Fleet He Take 1 Tab by mouth daily. MEGARED OMEGA-3 KRILL OIL 1,000-230-60 mg Cap Generic drug:  krill-om-3-dha-epa-phospho-ast  
Take  by mouth. PriLOSEC 20 mg capsule Generic drug:  omeprazole Take 20 mg by mouth daily. TOPROL XL 25 mg XL tablet Generic drug:  metoprolol succinate TAKE 1 TABLET BY MOUTH TWICE DAILY  
  
 traMADol 50 mg tablet Commonly known as:  ULTRAM  
Take 50 mg by mouth as needed for Pain. VITAMIN C PO Take 1 Tab by mouth Three (3) times a week. VITAMIN D 2,000 unit Cap capsule Generic drug:  Cholecalciferol (Vitamin D3) Take 1 Tab by mouth daily. warfarin 2 mg tablet Commonly known as:  COUMADIN Take 1 and 1/2 tablets by mouth once daily or as directed by physicain We Performed the Following AMB POC EKG ROUTINE W/ 12 LEADS, INTER & REP [55611 CPT(R)] Follow-up Instructions Return in about 6 months (around 3/27/2018), or if symptoms worsen or fail to improve. Introducing [facility]! Mildred Sheets introduces [employer] patient portal. Now you can access parts of your medical record, email your doctor's office, and request medication refills online. 1. In your internet browser, go to [email or web address] 2. Click on the First Time User? Click Here link in the Sign In box. You will see the New Member Sign Up page. 3. Enter your S[employer] Access Code exactly as it appears below. You will not need to use this code after youve completed the sign-up process. If you do not sign up before the expiration date, you must request a new code. · [employer] Access Code: [facility] Expires: 12/26/2017 11:03 AM 
 
4. Enter the last four digits of your Social Security Number (xxxx) and Date of Birth (mm/dd/yyyy) as indicated and click Submit. You will be taken to the next sign-up page. 5. Create a [employer] ID. This will be your [employer] login ID and cannot be changed, so think of one that is secure and easy to remember. 6. Create a [employer] password. You can change your password at any time. 7. Enter your Password Reset Question and Answer. This can be used at a later time if you forget your password. 8. Enter your e-mail address. You will receive e-mail notification when new information is available in [address] 9. Click Sign Up. You can now view and download portions of your medical record. 10. Click the Download Summary menu link to download a portable copy of your medical information. If you have questions, please visit the Frequently Asked Questions section of the [employer] website. Remember, [employer] is NOT to be used for urgent needs. For medical emergencies, dial 911. Now available from your iPhone and Android! Please provide this summary of care documentation to your next provider. Your primary care clinician is listed as JOSE CARLOS GEORGE. If you have any questions after today's visit, please call [phone].

## 2017-09-27 NOTE — PROGRESS NOTES
HPI:  I saw Vivian CastanedaKhanh Ram in my office today in cardiovascular evaluation regarding her chronic coronary artery disease. Ms. Kristen Ram is a very pleasant 71year old white female with history of coronary artery disease, status post coronary artery bypass grafting surgery with left internal mammary artery to the LAD done as a midcab procedure after a failed angioplasty in 1996. She has done reasonably well on medical therapy over the years. Her last screening nuclear myocardial perfusion study in July of 2014 demonstrated some distal inferolateral and apical akinesia, but still fairly normal overall left ventricular function with ejection fraction of 62% and no signs of any ongoing ischemia. She comes to the office today and relates that she has little lower extremity edema and occasional palpitations as well as her mild chronic anginal symptoms which she has described over the years which are rather infrequent and are not worsening. She does have some rare palpitations which are poorly described but denies any other cardiovascular complaints. Encounter Diagnoses   Name Primary?  Atherosclerosis of native coronary artery of native heart without angina pectoris Yes    S/P CABG x 1 in 1996     Dyslipidemia     Hypertensive heart disease      LBBB (left bundle branch block)        Discussion: This lady appears to be doing about as well as we could expect and really of no recommendations for change at this time. She is not having any symptoms to suggest development of worsening of her underlying chronic coronary artery disease or heart failure symptomatology. Her latest lipid profile that I have a copy of was done in January 2017 showed total cholesterol 154 with triglycerides of 120, HDL of 55, LDL of 75, and VLDL 24 which is fair control on Lipitor 40 mg daily.   I would like to see somewhat better with an LDL under 70 does have myalgias with higher doses of Lipitor so I think were going to have to leave her on her present regimen. She has developed a chronic left bundle branch block since I last saw her in the office on March 10, 2017 but she is not having any new cardiovascular symptoms and she has had the bundle branch block intermittently in the past. We did do a nuclear myocardial perfusion study on her back on March 15, 2017 which demonstrated a medium sized defect involving the majority of the left ventricular apex and distal inferolateral wall consistent with prior infarction without ischemia and mildly depressed overall left trigger function with ejection fraction 46%. Compared to her study of July 7, 2014 overall heart function dropped from 62% 46% did suggest some interim heart damage, but since there was no ischemia we decided to simply continue to treat her medically at this juncture I would continue that plan. Her EKG appears to be stable and her blood pressure is very well-controlled today so I will simply get a plan to see her again in several months or as needed if any new cardiovascular symptoms surface in the interim. PCP: Joana Huff MD       Past Medical History:   Diagnosis Date    Abnormal myocardial perfusion study 07/07/2014    Mid to distal inferolateral & apical fixed defect c/w prior infarction. No ongoing ischemia. EF 62%. Distal inferolateral & apical akin. Rate-dependent LBBB & occasional PAC & PVCs w/exercise. Ex time 3 min 46 sec.        Arthritis     Broken wrist     fractured elbow also    Crush injury 9/23/09    fall w/ secondary crush injury to a portion of the elbow w/ subsequent surgery; frozen shoulder     CVA (cerebral vascular accident) ([facility] [address]     hx of 2 small CVAs    Disc disease, degenerative, cervical     Heart abnormality     Heart disease     atherosclerotic; s/p MIDCAB in 1996 w/ a lt internal mammary artery to the LAD, which was patent and nuclear study in 2007, demonstrating some apical scar w/o ischemia, for medical therapy    History of cardiac cath 06/17/2004    Small RCA - mild. LM - mild. LAD - large aneurysmal segment. Mild LAD and D2. LIMA ok. EF 45-50%. Anterolateral/apical hyk to dysk.  History of echocardiogram 07/17/2015    EF 50-55%. Apical dysk. Mild LVH  Gr 1 DDfx. No significant valvular heart disease.  Hypercholesterolemia     Hypertension     Hypertensive cardiovascular disease     LBP (low back pain)     Lower extremity venous duplex 07/01/2004    No DVT bilaterally. Patent R SFA w/o pseudoaneurysm.  Musculoskeletal chest pain     Reflux     S/P CABG x 1 09/14/96    LIMA-LAD     Stroke [facility]) [id number]         Past Surgical History:   Procedure Laterality Date    CARDIAC SURG PROCEDURE UNLIST      open heart surgery    HX CORONARY ARTERY BYPASS GRAFT  1996    lt internal mammary artery to lt anterior descending after a failed angioplasty    HX GYN      removal of left ovarian tumor four years ago    HX ORTHOPAEDIC  9/08    pinning of fracture in rt forearm    HX OTHER SURGICAL      bilateral TMJ surgery    HX OTHER SURGICAL  10/31/15    vagina biopsy of lesions         Current Outpatient Rx   Name  Route  Sig  Dispense  Refill    warfarin (COUMADIN) 2 mg tablet        Take 1 and 1/2 tablets by mouth once daily or as directed by physicain    45 Tab    5      metoprolol succinate (TOPROL XL) 25 mg XL tablet    Oral    Take 1 Tab by mouth two (2) times a day. 60 Tab    6      ezetimibe (ZETIA) 10 mg tablet    Oral    Take 10 mg by mouth daily.  traMADol (ULTRAM) 50 mg tablet    Oral    Take 50 mg by mouth as needed for Pain.  amitriptyline (ELAVIL) 10 mg tablet    Oral    Take 10 mg by mouth nightly.  atorvastatin (LIPITOR) 40 mg tablet    Oral    Take 40 mg by mouth daily.  lisinopril-hydrochlorothiazide (PRINZIDE, ZESTORETIC) 10-12.5 mg per tablet    Oral    Take 1 Tab by mouth daily.                 Cholecalciferol, Vitamin D3, (VITAMIN D) 2,000 unit Cap    Oral    Take 1 Tab by mouth daily.  omeprazole (PRILOSEC) 20 mg capsule    Oral    Take 20 mg by mouth daily.  ASCORBATE CALCIUM (VITAMIN C PO)    Oral    Take 1 Tab by mouth Three (3) times a week. Allergies   Allergen Reactions    Codeine Nausea Only    Crestor [Rosuvastatin] Myalgia    Lipitor [Atorvastatin] Myalgia     High dose only    Other Medication Other (comments)     Unable to tolerate any pain medications due to severe GI upset    Penicillins Rash     And swelling         Social   Social History   Substance Use Topics    Smoking status: Never Smoker    Smokeless tobacco: Never Used    Alcohol use No         Family history: family history includes Cancer in her father and mother. Review of Systems:    Constitutional: Negative. Respiratory: Positive for cough, sputum production and wheezing. Negative for hemoptysis and shortness of breath. Cardiovascular: Positive for chest pain, palpitations and leg swelling. Negative for orthopnea, claudication and PND. Gastrointestinal: Negative. Musculoskeletal: Positive for back pain, joint pain, myalgias and neck pain. Negative for falls. Physical Exam:   The patient is an alert, oriented, well developed, well nourished 71 y.o.  female who was in no acute distress at the time of my examination. Visit Vitals    /82    Pulse (!) 59    Ht 5' 6\" (1.676 m)    Wt 106.1 kg (234 lb)    SpO2 97%    BMI 37.77 kg/m2      BP Readings from Last 3 Encounters:   09/27/17 124/82   03/13/17 (!) 175/95   09/01/16 130/74        Wt Readings from Last 3 Encounters:   09/27/17 106.1 kg (234 lb)   03/10/17 104.3 kg (230 lb)   09/01/16 107 kg (236 lb)       HEENT: Conjuctiva white, mucosa moist, no pallor or cyanosis. Neck: Supple without masses, tenderness or thyromegaly. No jugular venous distention. Carotid upstrokes are full bilaterally, without bruits. Cardiovascular: Chest is symmetrical with good excursion. Pendulous breasts. [place] is not displaced with a discreetormal, without appreciable murmurs, rubs, clicks or gallops. Lungs: Clear to auscultation in all fields. Abdomen: Soft. No masses, tenderness or organomegaly. Extremities: Mild edema in her ankles with full peripheral pulses. Review of Data: Please refer to past medical history for most recent cardiac testing. Lab Results   Component Value Date/Time    Cholesterol, total 154 01/13/2017 11:25 AM    HDL Cholesterol 55 01/13/2017 11:25 AM    LDL, calculated 75 01/13/2017 11:25 AM    Triglyceride 120 01/13/2017 11:25 AM    CHOL/HDL Ratio 2.8 01/13/2017 11:25 AM       Results for orders placed or performed in visit on 03/10/17   AMB POC EKG ROUTINE W/ 12 LEADS, INTER & REP     Status: None    Narrative    Sinus bradycardia, rate 48. First-degree AV block. Possible old anterior infarction with questionable Q waves in V1 through V3. Diffuse ST-T flattening with some mild T-wave inversions anteriorly which could suggest ischemia. Compared to the EKG of September 1, 2016 there was no significant change of the deep Q-wave in lead V3 is new and may potentially be related to lead placement variation. Pramod Avendaño D.O., F.A.C.C. [facility]  [facility] [place]  [address] [place] [address]. [address] [facility] [address]    [phone]     PLEASE NOTE:  This document has been produced using voice recognition software. Unrecognized errors in transcription may be present.

## 2017-09-27 NOTE — PROGRESS NOTES
1. Have you been to the ER, urgent care clinic since your last visit? Hospitalized since your last visit?no    2. Have you seen or consulted any other health care providers outside of the [address] [place] [address] since your last visit? Include any pap smears or colon screening.  no

## 2017-09-27 NOTE — PROGRESS NOTES
Review of Systems   Constitutional: Negative. Respiratory: Positive for cough, sputum production and wheezing. Negative for hemoptysis and shortness of breath. Cardiovascular: Positive for chest pain, palpitations and leg swelling. Negative for orthopnea, claudication and PND. Gastrointestinal: Negative. Musculoskeletal: Positive for back pain, joint pain, myalgias and neck pain. Negative for falls.

## 2017-09-29 ENCOUNTER — TELEPHONE ANTICOAG (OUTPATIENT)
Dept: CARDIOLOGY CLINIC | Age: 72
End: 2017-09-29

## 2017-09-29 DIAGNOSIS — Z79.01 LONG TERM CURRENT USE OF ANTICOAGULANT THERAPY: ICD-10-CM

## 2017-09-29 LAB — INR, EXTERNAL: 2.3

## 2017-09-29 NOTE — PROGRESS NOTES
Verbal order and read back per Marie Sims NP  The INR is stable and therapeutic.  Continue same dose of coumadin and recheck in 2 weeks  Continue Coumadin 2 mg daily except 1 mg on Mon   Patient informed of instructions, read back and verbalized understanding Marcine Boast, LPN

## 2017-10-04 ENCOUNTER — TELEPHONE (OUTPATIENT)
Dept: CARDIOLOGY CLINIC | Age: 72
End: 2017-10-04

## 2017-10-14 LAB — INR, EXTERNAL: 2.5

## 2017-10-20 ENCOUNTER — TELEPHONE ANTICOAG (OUTPATIENT)
Dept: CARDIOLOGY CLINIC | Age: 72
End: 2017-10-20

## 2017-10-20 DIAGNOSIS — Z79.01 LONG TERM CURRENT USE OF ANTICOAGULANT THERAPY: ICD-10-CM

## 2017-10-20 NOTE — PROGRESS NOTES
Verbal order and read back per Macrina Escoto NP  The INR is stable and therapeutic.  Continue same dose of coumadin and recheck in 2 weeks  Continue Coumadin 2 mg daily except 1 mg on Mon   Patient informed of instructions, read back and verbalized understanding Juliano Wolf LPN

## 2017-11-10 ENCOUNTER — TELEPHONE ANTICOAG (OUTPATIENT)
Dept: CARDIOLOGY CLINIC | Age: 72
End: 2017-11-10

## 2017-11-10 DIAGNOSIS — Z79.01 LONG TERM CURRENT USE OF ANTICOAGULANT THERAPY: ICD-10-CM

## 2017-11-10 LAB — INR, EXTERNAL: 2.7

## 2017-11-10 NOTE — PROGRESS NOTES
Verbal order and read back per Viviane Tafoya,   The INR is stable and therapeutic.  Continue same dose of coumadin and recheck in 2 weeks  Continue Coumadin 2 mg daily except 1 mg on Mon   Patient informed of instructions, read back and verbalized understanding Daron Wong LPN

## 2017-11-24 LAB — INR, EXTERNAL: 2.5

## 2017-11-28 ENCOUNTER — TELEPHONE ANTICOAG (OUTPATIENT)
Dept: CARDIOLOGY CLINIC | Age: 72
End: 2017-11-28

## 2017-11-28 DIAGNOSIS — Z79.01 LONG TERM CURRENT USE OF ANTICOAGULANT THERAPY: ICD-10-CM

## 2017-11-28 NOTE — PROGRESS NOTES
Verbal order and read back per Ken Noble NP  The INR is stable and therapeutic.  Continue same dose of coumadin and recheck in 2 weeks  Continue Coumadin 2 mg daily except 1 mg on Mon   Patient informed of instructions, read back and verbalized understanding Hoa Angulo LPN

## 2017-12-09 LAB — INR, EXTERNAL: 2.5

## 2017-12-11 ENCOUNTER — TELEPHONE ANTICOAG (OUTPATIENT)
Dept: CARDIOLOGY CLINIC | Age: 72
End: 2017-12-11

## 2017-12-11 DIAGNOSIS — Z79.01 LONG TERM CURRENT USE OF ANTICOAGULANT THERAPY: ICD-10-CM

## 2017-12-11 NOTE — PROGRESS NOTES
Verbal order and read back per Hetal Coleman NP  The INR is stable and therapeutic.  Continue same dose of coumadin and recheck in 2 weeks  Continue Coumadin 2 mg daily except 1 mg on Mon   Patient informed of instructions, read back and verbalized understanding Quyen Chandra LPN

## 2017-12-26 ENCOUNTER — OFFICE VISIT (OUTPATIENT)
Dept: UROLOGY | Age: 72
End: 2017-12-26

## 2017-12-26 VITALS
DIASTOLIC BLOOD PRESSURE: 70 MMHG | HEIGHT: 66 IN | HEART RATE: 66 BPM | WEIGHT: 234 LBS | TEMPERATURE: 98.5 F | OXYGEN SATURATION: 98 % | BODY MASS INDEX: 37.61 KG/M2 | SYSTOLIC BLOOD PRESSURE: 130 MMHG

## 2017-12-26 DIAGNOSIS — R31.0 GROSS HEMATURIA: Primary | ICD-10-CM

## 2017-12-26 DIAGNOSIS — R39.15 URGENCY OF URINATION: ICD-10-CM

## 2017-12-26 DIAGNOSIS — R35.1 NOCTURIA: ICD-10-CM

## 2017-12-26 LAB
BILIRUB UR QL STRIP: NEGATIVE
GLUCOSE UR-MCNC: NEGATIVE MG/DL
INR, EXTERNAL: 2
KETONES P FAST UR STRIP-MCNC: NEGATIVE MG/DL
PH UR STRIP: 7 [PH] (ref 4.6–8)
PROT UR QL STRIP: NEGATIVE
SP GR UR STRIP: 1.01 (ref 1–1.03)
UA UROBILINOGEN AMB POC: NORMAL (ref 0.2–1)
URINALYSIS CLARITY POC: CLEAR
URINALYSIS COLOR POC: YELLOW
URINE BLOOD POC: NEGATIVE
URINE LEUKOCYTES POC: NEGATIVE
URINE NITRITES POC: NEGATIVE

## 2017-12-26 RX ORDER — ACETAMINOPHEN 325 MG/1
1000 TABLET ORAL AS NEEDED
COMMUNITY
End: 2022-07-08

## 2017-12-26 RX ORDER — LANOLIN ALCOHOL/MO/W.PET/CERES
1000 CREAM (GRAM) TOPICAL DAILY
COMMUNITY

## 2017-12-26 RX ORDER — CYCLOBENZAPRINE HCL 5 MG
5 TABLET ORAL AS NEEDED
COMMUNITY
End: 2019-03-11

## 2017-12-26 NOTE — PATIENT INSTRUCTIONS

## 2017-12-26 NOTE — MR AVS SNAPSHOT
Visit Information Date & Time Provider Department Dept. Phone Encounter #  
 12/26/2017  2:30 PM [facility] [phone] Your Appointments 1/16/2018  1:45 PM  
PROCEDURE with Kaylie Armstrong MD  
[facility] [address] [facility]) Appt Note: cysto [address] [address] [address] [address]  
[phone] [address] [address]  
  
    
 4/16/2018 11:20 AM  
Follow Up with Summer Bright DO [facility] [employer] ([address] [facility]) Appt Note: 6 month f[facility] [address] [address] [address]  
[phone] [place] [address] [address] Upcoming Health Maintenance Date Due Hepatitis C Screening 1945 DTaP/Tdap/Td series (1 - Tdap) 4/27/1967 FOBT Q 1 YEAR AGE 50-75 4/27/1996 ZOSTER VACCINE AGE 60> 2/27/2006 GLAUCOMA SCREENING Q2Y 4/27/2011 Pneumococcal 65+ Low/Medium Risk (1 of 2 - PCV13) 4/27/2011 MEDICARE YEARLY EXAM 4/27/2011 Influenza Age 5 to Adult 8/1/2017 Allergies as of 12/26/2017  Review Complete On: 12/26/2017 By: Adolfo Lantgiua LPN Severity Noted Reaction Type Reactions Codeine High 07/26/2016   Intolerance Nausea Only Other reaction(s): gi distress Crestor [Rosuvastatin]  07/16/2014    Myalgia Lipitor [Atorvastatin]  07/16/2014   Intolerance Myalgia High dose only Morphine  06/21/2017    Other (comments) Other Medication   Intolerance Other (comments) Unable to tolerate any pain medications due to severe GI upset Penicillins  07/26/2016   Intolerance Rash Other reaction(s): mild rash/itching And swelling Current Immunizations  Never Reviewed No immunizations on file. Not reviewed this visit You Were Diagnosed With   
  
 Codes Comments Gross hematuria    -  Primary ICD-10-CM: R31.0 ICD-9-CM: 599.71 Nocturia     ICD-10-CM: R35.1 ICD-9-CM: 788.43 Urgency of urination     ICD-10-CM: R39.15 ICD-9-CM: 976.61 Vitals BP Pulse Temp Height(growth percentile) Weight(growth percentile) SpO2  
 130/70 (BP 1 Location: Left arm, BP Patient Position: Sitting) 66 98.5 °F (36.9 °C) 5' 6\" (1.676 m) 234 lb (106.1 kg) 98% BMI OB Status Smoking Status 37.77 kg/m2 Postmenopausal Former Smoker Vitals History BMI and BSA Data Body Mass Index Body Surface Area  
 37.77 kg/m 2 2.22 m 2 Preferred Pharmacy Pharmacy Name Phone [facility] [address] [place] [facility] [phone] Your Updated Medication List  
  
   
This list is accurate as of: 12/26/17  2:58 PM.  Always use your most recent med list.  
  
  
  
  
 amitriptyline 10 mg tablet Commonly known as:  ELAVIL Take 10 mg by mouth nightly. cyclobenzaprine 5 mg tablet Commonly known as:  FLEXERIL Take 5 mg by mouth. LIPITOR 40 mg tablet Generic drug:  atorvastatin Take 40 mg by mouth daily. lisinopril-hydroCHLOROthiazide 10-12.5 mg per tablet Commonly known as:  Conda Franc Take 1 Tab by mouth daily. MEGARED OMEGA-3 KRILL OIL 1,000-230-60 mg Cap Generic drug:  krill-om-3-dha-epa-phospho-ast  
Take  by mouth. PriLOSEC 20 mg capsule Generic drug:  omeprazole Take 20 mg by mouth daily. TOPROL XL 25 mg XL tablet Generic drug:  metoprolol succinate TAKE 1 TABLET BY MOUTH TWICE DAILY  
  
 traMADol 50 mg tablet Commonly known as:  ULTRAM  
Take 50 mg by mouth as needed for Pain. TYLENOL 325 mg tablet Generic drug:  acetaminophen Take  by mouth every four (4) hours as needed for Pain. VITAMIN B-12 1,000 mcg tablet Generic drug:  cyanocobalamin Take 1,000 mcg by mouth daily. VITAMIN C PO Take 1 Tab by mouth Three (3) times a week. VITAMIN D 2,000 unit Cap capsule Generic drug:  Cholecalciferol (Vitamin D3) Take 1 Tab by mouth daily. warfarin 2 mg tablet Commonly known as:  COUMADIN Take 1 and 1/2 tablets by mouth once daily or as directed by physicain We Performed the Following AMB POC URINALYSIS DIP STICK AUTO W/O MICRO [15120 CPT(R)] Patient Instructions Blood in the Urine: Care Instructions Your Care Instructions Blood in the urine, or hematuria, may make the urine look red, brown, or pink. There may be blood every time you urinate or just from time to time. You cannot always see blood in the urine, but it will show up in a urine test. 
Blood in the urine may be serious. It should always be checked by a doctor. Your doctor may recommend more tests, including an X-ray, a CT scan, or a cystoscopy (which lets a doctor look inside the urethra and bladder). Blood in the urine can be a sign of another problem. Common causes are bladder infections and kidney stones. An injury to your groin or your genital area can also cause bleeding in the urinary tract. Very hard exercise-such as running a marathon-can cause blood in the urine. Blood in the urine can also be a sign of kidney disease or cancer in the bladder or kidney. Many cases of blood in the urine are caused by a harmless condition that runs in families. This is called benign familial hematuria. It does not need any treatment. Sometimes your urine may look red or brown even though it does not contain blood. For example, not getting enough fluids (dehydration), taking certain medicines, or having a liver problem can change the color of your urine. Eating foods such as beets, rhubarb, or blackberries or foods with red food coloring can make your urine look red or pink. Follow-up care is a key part of your treatment and safety.  Be sure to make and go to all appointments, and call your doctor if you are having problems. It's also a good idea to know your test results and keep a list of the medicines you take. When should you call for help? Call your doctor now or seek immediate medical care if: 
· You have symptoms of a urinary infection. For example: ¨ You have pus in your urine. ¨ You have pain in your back just below your rib cage. This is called flank pain. ¨ You have a fever, chills, or body aches. ¨ It hurts to urinate. ¨ You have groin or belly pain. · You have more blood in your urine. Watch closely for changes in your health, and be sure to contact your doctor if: 
· You have new urination problems. · You do not get better as expected. Where can you learn more? Go to http://eufemia-maykel.info/. Enter N223 in the search box to learn more about \"Blood in the Urine: Care Instructions. \" Current as of: May 12, 2017 Content Version: 11.4 © 2745-2535 [email or web address]. Care instructions adapted under license by [employer] (which disclaims liability or warranty for this information). If you have questions about a medical condition or this instruction, always ask your healthcare professional. [address] [address] any warranty or liability for your use of this information. Introducing \[facility] [place] [facility]\"" & HEALTH SERVICES! [facility] introduces VinPerfect patient portal. Now you can access parts of your medical record, email your doctor's office, and request medication refills online. 1. In your internet browser, go to [email or web address] 2. Click on the First Time User? Click Here link in the Sign In box. You will see the New Member Sign Up page. 3. Enter your VinPerfect Access Code exactly as it appears below. You will not need to use this code after youve completed the sign-up process. If you do not sign up before the expiration date, you must request a new code. · VinPerfect Access Code: [email or web address] Expires: 3/26/2018  1:07 PM 
 
 4. Enter the last four digits of your Social Security Number (xxxx) and Date of Birth (mm/dd/yyyy) as indicated and click Submit. You will be taken to the next sign-up page. 5. Create a Big Live ID. This will be your Big Live login ID and cannot be changed, so think of one that is secure and easy to remember. 6. Create a Big Live password. You can change your password at any time. 7. Enter your Password Reset Question and Answer. This can be used at a later time if you forget your password. 8. Enter your e-mail address. You will receive e-mail notification when new information is available in [address] 9. Click Sign Up. You can now view and download portions of your medical record. 10. Click the Download Summary menu link to download a portable copy of your medical information. If you have questions, please visit the Frequently Asked Questions section of the Big Live website. Remember, Big Live is NOT to be used for urgent needs. For medical emergencies, dial 911. Now available from your iPhone and Android! Please provide this summary of care documentation to your next provider. Your primary care clinician is listed as JOSE CARLOS GEORGE. If you have any questions after today's visit, please call [phone].

## 2017-12-26 NOTE — PROGRESS NOTES
Antoni Lose 71 y.o. female     Ms. 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 10 years ago    Review of Systems:   CNS: No seizure syncope headaches dizziness or visual changes  Respiratory: No wheezing shortness of breath or coughing  Cardiovascular: Hypertension  Intestinal: Constipation/diarrhea  Urinary: Urinary urgency frequency with stress incontinence and urgency incontinence  Skeletal: Chronic low back pain muscle aches and pains large joint arthritis  Endocrine: No diabetes or thyroid disease  Other                                                                                  2 para 2  ×2:    Allergies: Allergies   Allergen Reactions    Codeine Nausea Only     Other reaction(s): gi distress    Crestor [Rosuvastatin] Myalgia    Lipitor [Atorvastatin] Myalgia     High dose only    Morphine Other (comments)    Other Medication Other (comments)     Unable to tolerate any pain medications due to severe GI upset    Penicillins Rash     Other reaction(s): mild rash/itching  And swelling      Medications:    Current Outpatient Prescriptions   Medication Sig Dispense Refill    cyclobenzaprine (FLEXERIL) 5 mg tablet Take 5 mg by mouth.  cyanocobalamin (VITAMIN B-12) 1,000 mcg tablet Take 1,000 mcg by mouth daily.  acetaminophen (TYLENOL) 325 mg tablet Take  by mouth every four (4) hours as needed for Pain.       TOPROL XL 25 mg XL tablet TAKE 1 TABLET BY MOUTH TWICE DAILY 60 Tab 5    warfarin (COUMADIN) 2 mg tablet Take 1 and 1/2 tablets by mouth once daily or as directed by physicain 45 Tab 5    atorvastatin (LIPITOR) 40 mg tablet Take 40 mg by mouth daily.  lisinopril-hydrochlorothiazide (PRINZIDE, ZESTORETIC) 10-12.5 mg per tablet Take 1 Tab by mouth daily.  Cholecalciferol, Vitamin D3, (VITAMIN D) 2,000 unit Cap Take 1 Tab by mouth daily.  krill-om-3-dha-epa-phospho-ast (MEGARED OMEGA-3 KRILL OIL) 1,000-230-60 mg cap Take  by mouth.  traMADol (ULTRAM) 50 mg tablet Take 50 mg by mouth as needed for Pain.  amitriptyline (ELAVIL) 10 mg tablet Take 10 mg by mouth nightly.  omeprazole (PRILOSEC) 20 mg capsule Take 20 mg by mouth daily.  ASCORBATE CALCIUM (VITAMIN C PO) Take 1 Tab by mouth Three (3) times a week. Past Medical History:   Diagnosis Date    Abnormal myocardial perfusion study 07/07/2014    Mid to distal inferolateral & apical fixed defect c/w prior infarction. No ongoing ischemia. EF 62%. Distal inferolateral & apical akin. Rate-dependent LBBB & occasional PAC & PVCs w/exercise. Ex time 3 min 46 sec.  Arthritis     Broken wrist     fractured elbow also    Constipation     Crush injury 9/23/09    fall w/ secondary crush injury to a portion of the elbow w/ subsequent surgery; frozen shoulder     CVA [facility]cerebral vascular accident) ([facility] [address]     hx of 2 small CVAs    Diarrhea     Disc disease, degenerative, cervical     Heart abnormality     Heart disease     atherosclerotic; s/p MIDCAB in 1996 w/ a lt internal mammary artery to the LAD, which was patent and nuclear study in 2007, demonstrating some apical scar w/o ischemia, for medical therapy    History of cardiac cath 06/17/2004    Small RCA - mild. LM - mild. LAD - large aneurysmal segment. Mild LAD and D2. LIMA ok. EF 45-50%. Anterolateral/apical hyk to dysk.  History of echocardiogram 07/17/2015    EF 50-55%. Apical dysk. Mild LVH  Gr 1 DDfx. No significant valvular heart disease.     Hypercholesterolemia     Hypertension     Hypertensive cardiovascular disease     Joint pain     Joint swelling     LBP (low back pain)     Lower extremity venous duplex 07/01/2004    No DVT bilaterally. Patent R SFA w/o pseudoaneurysm.  Muscle ache     Muscle pain     Musculoskeletal chest pain     PVC (premature ventricular contraction)     Reflux     S/P CABG x 1 09/14/96    LIMA-LAD     Sinus problem     Stiff joint     Stroke [facility]) [id number]    Stroke [facility])       Past Surgical History:   Procedure Laterality Date    CARDIAC SURG PROCEDURE UNLIST      open heart surgery    HX CORONARY ARTERY BYPASS GRAFT  1996    lt internal mammary artery to lt anterior descending after a failed angioplasty    HX GYN      removal of left ovarian tumor four years ago    HX ORTHOPAEDIC  9/08    pinning of fracture in rt forearm    HX OTHER SURGICAL      bilateral TMJ surgery    HX OTHER SURGICAL  10/31/15    vagina biopsy of lesions     Family History   Problem Relation Age of Onset    Cancer Mother     Cancer Father     Diabetes Brother     Hypertension Brother       Physical Examination: Mature female increased BMI in no apparent distress    Abdomen is nontender no palpable masses no organomegaly  Back-no percussion CVA tenderness on either side      Urinalysis: Negative dipstick/nitrite negative    Ultrasound imaging of the kidneys shows normal findings    PVR today is 0    Impression: Overactive bladder with urgency incontinence                        Stress urinary incontinence        Plan: Cystoscopy with speculum exam, gross EMG, Julito Lek test, etc.        Zenaida Lange MD  -electronically signed-    PLEASE NOTE:  This document has been produced using voice recognition software. Unrecognized errors in transcription may be present.

## 2017-12-28 ENCOUNTER — TELEPHONE ANTICOAG (OUTPATIENT)
Dept: CARDIOLOGY CLINIC | Age: 72
End: 2017-12-28

## 2017-12-28 DIAGNOSIS — Z79.01 LONG TERM CURRENT USE OF ANTICOAGULANT THERAPY: ICD-10-CM

## 2017-12-28 NOTE — PROGRESS NOTES
Verbal order and read back per Janis Yang NP  The INR is stable and therapeutic.  Continue same dose of coumadin and recheck in 2 weeks  Continue Coumadin 2 mg daily except 1 mg on Mon   Patient informed of instructions, read back and verbalized understanding Daron Wong LPN

## 2018-01-09 ENCOUNTER — TELEPHONE ANTICOAG (OUTPATIENT)
Dept: CARDIOLOGY CLINIC | Age: 73
End: 2018-01-09

## 2018-01-09 DIAGNOSIS — Z79.01 LONG TERM CURRENT USE OF ANTICOAGULANT THERAPY: ICD-10-CM

## 2018-01-09 LAB — INR, EXTERNAL: 2.2

## 2018-01-09 NOTE — PROGRESS NOTES
Verbal order and read back per John Dewey NP  The INR is stable and therapeutic.  Continue same dose of coumadin and recheck in 2 weeks  Continue Coumadin 2 mg daily except 1 mg on Mon   Patient informed of instructions, read back and verbalized understanding Marie Dunbar LPN

## 2018-01-12 ENCOUNTER — HOSPITAL ENCOUNTER (OUTPATIENT)
Dept: LAB | Age: 73
Discharge: HOME | End: 2018-01-12
Payer: MEDICARE

## 2018-01-12 LAB
ALBUMIN SERPL-MCNC: 3.8 G/DL (ref 3.4–5)
ALBUMIN/GLOB SERPL: 1.4 {RATIO} (ref 0.8–1.7)
ALP SERPL-CCNC: 61 U/L (ref 45–117)
ALT SERPL-CCNC: 32 U/L (ref 13–56)
ANION GAP SERPL CALC-SCNC: 6 MMOL/L (ref 3–18)
AST SERPL-CCNC: 22 U/L (ref 15–37)
BASOPHILS # BLD: 0 K/UL (ref 0–0.1)
BASOPHILS NFR BLD: 0 % (ref 0–2)
BILIRUB SERPL-MCNC: 0.6 MG/DL (ref 0.2–1)
BUN SERPL-MCNC: 17 MG/DL (ref 7–18)
BUN/CREAT SERPL: 19 (ref 12–20)
CALCIUM SERPL-MCNC: 9.2 MG/DL (ref 8.5–10.1)
CHLORIDE SERPL-SCNC: 105 MMOL/L (ref 100–108)
CHOLEST SERPL-MCNC: 158 MG/DL
CO2 SERPL-SCNC: 31 MMOL/L (ref 21–32)
CREAT SERPL-MCNC: 0.89 MG/DL (ref 0.6–1.3)
DIFFERENTIAL METHOD BLD: ABNORMAL
EOSINOPHIL # BLD: 0.2 K/UL (ref 0–0.4)
EOSINOPHIL NFR BLD: 4 % (ref 0–5)
ERYTHROCYTE [DISTWIDTH] IN BLOOD BY AUTOMATED COUNT: 14 % (ref 11.6–14.5)
GLOBULIN SER CALC-MCNC: 2.7 G/DL (ref 2–4)
GLUCOSE SERPL-MCNC: 95 MG/DL (ref 74–99)
HCT VFR BLD AUTO: 41.6 % (ref 35–45)
HDLC SERPL-MCNC: 67 MG/DL (ref 40–60)
HDLC SERPL: 2.4 {RATIO} (ref 0–5)
HGB BLD-MCNC: 13.2 G/DL (ref 12–16)
LDLC SERPL CALC-MCNC: 65.6 MG/DL (ref 0–100)
LIPID PROFILE,FLP: ABNORMAL
LYMPHOCYTES # BLD: 1.4 K/UL (ref 0.9–3.6)
LYMPHOCYTES NFR BLD: 32 % (ref 21–52)
MCH RBC QN AUTO: 29 PG (ref 24–34)
MCHC RBC AUTO-ENTMCNC: 31.7 G/DL (ref 31–37)
MCV RBC AUTO: 91.4 FL (ref 74–97)
MONOCYTES # BLD: 0.5 K/UL (ref 0.05–1.2)
MONOCYTES NFR BLD: 13 % (ref 3–10)
NEUTS SEG # BLD: 2.1 K/UL (ref 1.8–8)
NEUTS SEG NFR BLD: 51 % (ref 40–73)
PLATELET # BLD AUTO: 191 K/UL (ref 135–420)
PMV BLD AUTO: 12 FL (ref 9.2–11.8)
POTASSIUM SERPL-SCNC: 4.1 MMOL/L (ref 3.5–5.5)
PROT SERPL-MCNC: 6.5 G/DL (ref 6.4–8.2)
RBC # BLD AUTO: 4.55 M/UL (ref 4.2–5.3)
SODIUM SERPL-SCNC: 142 MMOL/L (ref 136–145)
TRIGL SERPL-MCNC: 127 MG/DL (ref ?–150)
VLDLC SERPL CALC-MCNC: 25.4 MG/DL
WBC # BLD AUTO: 4.2 K/UL (ref 4.6–13.2)

## 2018-01-12 PROCEDURE — 80061 LIPID PANEL: CPT | Performed by: FAMILY MEDICINE

## 2018-01-12 PROCEDURE — 36415 COLL VENOUS BLD VENIPUNCTURE: CPT | Performed by: FAMILY MEDICINE

## 2018-01-12 PROCEDURE — 85025 COMPLETE CBC W/AUTO DIFF WBC: CPT | Performed by: FAMILY MEDICINE

## 2018-01-12 PROCEDURE — 80053 COMPREHEN METABOLIC PANEL: CPT | Performed by: FAMILY MEDICINE

## 2018-01-15 ENCOUNTER — DOCUMENTATION ONLY (OUTPATIENT)
Dept: CARDIOLOGY CLINIC | Age: 73
End: 2018-01-15

## 2018-01-15 RX ORDER — METOPROLOL SUCCINATE 25 MG/1
TABLET, EXTENDED RELEASE ORAL
Qty: 60 TAB | Refills: 5 | Status: SHIPPED | OUTPATIENT
Start: 2018-01-15 | End: 2018-04-12 | Stop reason: SDUPTHER

## 2018-01-15 NOTE — PROGRESS NOTES
Toprol XL approved from [facility] Coverages & Determinations for dates 1/1/18 - 1/15/19.

## 2018-01-16 ENCOUNTER — OFFICE VISIT (OUTPATIENT)
Dept: UROLOGY | Age: 73
End: 2018-01-16

## 2018-01-16 VITALS
SYSTOLIC BLOOD PRESSURE: 141 MMHG | WEIGHT: 234 LBS | BODY MASS INDEX: 37.61 KG/M2 | HEART RATE: 78 BPM | OXYGEN SATURATION: 99 % | DIASTOLIC BLOOD PRESSURE: 81 MMHG | TEMPERATURE: 97.7 F | HEIGHT: 66 IN

## 2018-01-16 DIAGNOSIS — N32.81 OVERACTIVE BLADDER: Primary | ICD-10-CM

## 2018-01-16 DIAGNOSIS — N39.3 STRESS INCONTINENCE: ICD-10-CM

## 2018-01-16 LAB
BILIRUB UR QL STRIP: NEGATIVE
GLUCOSE UR-MCNC: NEGATIVE MG/DL
KETONES P FAST UR STRIP-MCNC: NEGATIVE MG/DL
PH UR STRIP: 5.5 [PH] (ref 4.6–8)
PROT UR QL STRIP: NEGATIVE
SP GR UR STRIP: 1 (ref 1–1.03)
UA UROBILINOGEN AMB POC: NORMAL (ref 0.2–1)
URINALYSIS CLARITY POC: CLEAR
URINALYSIS COLOR POC: YELLOW
URINE BLOOD POC: NORMAL
URINE LEUKOCYTES POC: NEGATIVE
URINE NITRITES POC: NEGATIVE

## 2018-01-16 NOTE — PROGRESS NOTES
Ms. Rose Park has a reminder for a \"due or due soon\" health maintenance. I have asked that she contact her primary care provider for follow-up on this health maintenance. [facility]  OFFICE PROCEDURE PROGRESS NOTE        Chart reviewed for the following:   ISean LPN, have reviewed the History, Physical and updated the Allergic reactions for Aayush performed immediately prior to start of procedure:   Joaquín Bender LPN, have performed the following reviews on Roberta Lee prior to the start of the procedure:            * Patient was identified by name and date of birth   * Agreement on procedure being performed was verified  * Risks and Benefits explained to the patient  * Procedure site verified and marked as necessary  * Patient was positioned for comfort  * Consent was signed and verified     Time: 14:50      Date of procedure: 1/16/2018    Procedure performed by:  Faustino Serna MD    Provider assisted by: Swathi Kwon LPN    Patient assisted by: self    How tolerated by patient: tolerated the procedure well with no complications    Post Procedural Pain Scale: 0 - No Hurt    Comments: Patient verbalized understanding of procedure and post procedure instructions.

## 2018-01-16 NOTE — PATIENT INSTRUCTIONS
Cystoscopy: Care Instructions  Your Care Instructions  Cystoscopy is a test. It uses a thin, lighted tube called a cystoscope to see the inside of the bladder and the urethra. The urethra is the tube that carries urine out of the body. This test is helpful because it lets your doctor see areas of your bladder and urethra that are hard to see on X-rays. It can help your doctor find bladder stones, tumors, bleeding, and infection. During this test, your doctor also can take tissue and urine samples. And if your doctor finds small stones or growths, he or she can remove them. In most cases the scope is in the bladder for less than 10 minutes. But the entire test may take 45 minutes or longer. You will probably get local anesthesia. This numbs a small part of your body. Or you may get spinal anesthesia, which numbs more of your body. Once in a while, doctors use general anesthesia. It makes you sleep during surgery. If you get a local anesthetic, you may be able to get up right after the test. But if you had spinal or general anesthesia, you will stay in the recovery room until you are able to walk or you have feeling again in your lower body. This usually takes about an hour. Your doctor may be able to tell you some of the results right after the test. But the complete results may take several days. Follow-up care is a key part of your treatment and safety. Be sure to make and go to all appointments, and call your doctor if you are having problems. It's also a good idea to know your test results and keep a list of the medicines you take. How can you care for yourself at home? Before the test  · If you are having a local anesthetic, you can eat and drink before the test.  · If you are having a spinal or general anesthetic, do not eat or drink anything for at least 8 hours before the test. Tell your doctor what medicines you take.   · If you are not staying overnight in the hospital, make sure you have someone who can drive you home after the test.  After the test  · If your doctor prescribed antibiotics, take them as directed. Do not stop taking them just because you feel better. You need to take the full course of antibiotics. · You may have some burning when you urinate for a day or two after the test. You may feel better if you drink more fluids. This may also help prevent an infection. · Your urine may have a pinkish color for a few days after the test.  When should you call for help? Call your doctor now or seek immediate medical care if:  ? · Your urine is still red or you see blood clots after you have urinated several times. ? · You cannot pass urine 8 hours after the test.   ? · You get a fever or chills. ? · You have pain in your belly or your back just below your rib cage. This is also called flank pain. ? Watch closely for changes in your health, and be sure to contact your doctor if:  ? · You have pain or burning when you urinate. A burning sensation is normal for a day or two after the test. But call if it does not get better. ? · You have a frequent urge to urinate but can pass only small amounts of urine. ? · Your urine is pink, red, or cloudy or smells bad. It is normal for the urine to have a pinkish color for a few days after the test. But call if it does not get better. ? · You do not get better as expected. Where can you learn more? Go to http://eufemia-maykel.info/. Enter Y663 in the search box to learn more about \"Cystoscopy: Care Instructions. \"  Current as of: May 12, 2017  Content Version: 11.4  © 8601-9153 [employer]. Care instructions adapted under license by [employer] (which disclaims liability or warranty for this information).  If you have questions about a medical condition or this instruction, always ask your healthcare professional. [address] any warranty or liability for your use of this information.

## 2018-01-16 NOTE — MR AVS SNAPSHOT
[address] [facility] [address] [place] [address] 
[phone] Patient: Marek Andrade MRN: [id number] :1945 Visit Information Date & Time Provider Department Dept. Phone Encounter #  
 2018  1:45 PM Felicitas Dolan, Ashish [place] [facility] [phone] [id number] Your Appointments 2018 11:20 AM  
Follow Up with Isabelle Noonan DO [facility] ([facility] Appt Note: 6 month f/up [place] [address] [address] [address] [phone] [address] Upcoming Health Maintenance Date Due Hepatitis C Screening 1945 DTaP/Tdap/Td series (1 - Tdap) 1967 FOBT Q 1 YEAR AGE 50-75 1996 ZOSTER VACCINE AGE 60> 2006 GLAUCOMA SCREENING Q2Y 2011 Pneumococcal 65+ Low/Medium Risk (1 of 2 - PCV13) 2011 MEDICARE YEARLY EXAM 2011 Influenza Age 5 to Adult 2017 BREAST CANCER SCRN MAMMOGRAM 2019 Allergies as of 2018  Review Complete On: 2018 By: Lazarus Carbon, LPN Severity Noted Reaction Type Reactions Codeine High 2016   Intolerance Nausea Only Other reaction(s): gi distress Crestor [Rosuvastatin]  2014    Myalgia Lipitor [Atorvastatin]  2014   Intolerance Myalgia High dose only Morphine  2017    Other (comments) Other Medication   Intolerance Other (comments) Unable to tolerate any pain medications due to severe GI upset Penicillins  2016   Intolerance Rash Other reaction(s): mild rash/itching And swelling Current Immunizations  Never Reviewed No immunizations on file. Not reviewed this visit You Were Diagnosed With   
  
 Codes Comments Overactive bladder    -  Primary ICD-10-CM: N32.81 ICD-9-CM: 596.51   
 Stress incontinence     ICD-10-CM: N39.3 ICD-9-CM: UDY0601 Vitals BP Pulse Temp Height(growth percentile) Weight(growth percentile) SpO2  
 141/81 (BP 1 Location: Left arm, BP Patient Position: Sitting) 78 97.7 °F (36.5 °C) 5' 6\" (1.676 m) 234 lb (106.1 kg) 99% BMI OB Status Smoking Status 37.77 kg/m2 Postmenopausal Former Smoker Vitals History BMI and BSA Data Body Mass Index Body Surface Area  
 37.77 kg/m 2 2.22 m 2 Preferred Pharmacy Pharmacy Name Phone [facility] [address] [place] [facility] [phone] Your Updated Medication List  
  
   
This list is accurate as of: 1/16/18  3:08 PM.  Always use your most recent med list.  
  
  
  
  
 amitriptyline 10 mg tablet Commonly known as:  ELAVIL Take 10 mg by mouth nightly. cyclobenzaprine 5 mg tablet Commonly known as:  FLEXERIL Take 5 mg by mouth. LIPITOR 40 mg tablet Generic drug:  atorvastatin Take 40 mg by mouth daily. lisinopril-hydroCHLOROthiazide 10-12.5 mg per tablet Commonly known as:  Jaycob Sadia Take 1 Tab by mouth daily. MEGARED OMEGA-3 KRILL OIL 1,000-230-60 mg Cap Generic drug:  krill-om-3-dha-epa-phospho-ast  
Take  by mouth. PriLOSEC 20 mg capsule Generic drug:  omeprazole Take 20 mg by mouth daily. TOPROL XL 25 mg XL tablet Generic drug:  metoprolol succinate TAKE 1 TABLET BY MOUTH TWICE DAILY  
  
 traMADol 50 mg tablet Commonly known as:  ULTRAM  
Take 50 mg by mouth as needed for Pain. TYLENOL 325 mg tablet Generic drug:  acetaminophen Take  by mouth every four (4) hours as needed for Pain. VITAMIN B-12 1,000 mcg tablet Generic drug:  cyanocobalamin Take 1,000 mcg by mouth daily. VITAMIN C PO Take 1 Tab by mouth Three (3) times a week. VITAMIN D 2,000 unit Cap capsule Generic drug:  Cholecalciferol (Vitamin D3) Take 1 Tab by mouth daily. warfarin 2 mg tablet Commonly known as:  COUMADIN Take 1 and 1/2 tablets by mouth once daily or as directed by physicain We Performed the Following AMB POC URINALYSIS DIP STICK AUTO W/O MICRO [58016 CPT(R)] Patient Instructions Cystoscopy: Care Instructions Your Care Instructions Cystoscopy is a test. It uses a thin, lighted tube called a cystoscope to see the inside of the bladder and the urethra. The urethra is the tube that carries urine out of the body. This test is helpful because it lets your doctor see areas of your bladder and urethra that are hard to see on X-rays. It can help your doctor find bladder stones, tumors, bleeding, and infection. During this test, your doctor also can take tissue and urine samples. And if your doctor finds small stones or growths, he or she can remove them. In most cases the scope is in the bladder for less than 10 minutes. But the entire test may take 45 minutes or longer. You will probably get local anesthesia. This numbs a small part of your body. Or you may get spinal anesthesia, which numbs more of your body. Once in a while, doctors use general anesthesia. It makes you sleep during surgery. If you get a local anesthetic, you may be able to get up right after the test. But if you had spinal or general anesthesia, you will stay in the recovery room until you are able to walk or you have feeling again in your lower body. This usually takes about an hour. Your doctor may be able to tell you some of the results right after the test. But the complete results may take several days. Follow-up care is a key part of your treatment and safety. Be sure to make and go to all appointments, and call your doctor if you are having problems. It's also a good idea to know your test results and keep a list of the medicines you take. How can you care for yourself at home?  
Before the test 
 · If you are having a local anesthetic, you can eat and drink before the test. 
· If you are having a spinal or general anesthetic, do not eat or drink anything for at least 8 hours before the test. Tell your doctor what medicines you take. · If you are not staying overnight in the hospital, make sure you have someone who can drive you home after the test. 
After the test 
· If your doctor prescribed antibiotics, take them as directed. Do not stop taking them just because you feel better. You need to take the full course of antibiotics. · You may have some burning when you urinate for a day or two after the test. You may feel better if you drink more fluids. This may also help prevent an infection. · Your urine may have a pinkish color for a few days after the test. 
When should you call for help? Call your doctor now or seek immediate medical care if: 
? · Your urine is still red or you see blood clots after you have urinated several times. ? · You cannot pass urine 8 hours after the test.  
? · You get a fever or chills. ? · You have pain in your belly or your back just below your rib cage. This is also called flank pain. ? Watch closely for changes in your health, and be sure to contact your doctor if: 
? · You have pain or burning when you urinate. A burning sensation is normal for a day or two after the test. But call if it does not get better. ? · You have a frequent urge to urinate but can pass only small amounts of urine. ? · Your urine is pink, red, or cloudy or smells bad. It is normal for the urine to have a pinkish color for a few days after the test. But call if it does not get better. ? · You do not get better as expected. Where can you learn more? Go to http://eufemia-maykel.info/. Enter A252 in the search box to learn more about \"Cystoscopy: Care Instructions. \" Current as of: May 12, 2017 Content Version: 11.4 © 4178-2064 Healthwise, Incorporated. Care instructions adapted under license by [employer] (which disclaims liability or warranty for this information). If you have questions about a medical condition or this instruction, always ask your healthcare professional. [address] any warranty or liability for your use of this information. Introducing hospitals & HEALTH SERVICES! Rissaalex Woodard introduces [employer] patient portal. Now you can access parts of your medical record, email your doctor's office, and request medication refills online. 1. In your internet browser, go to [email or web address] 2. Click on the First Time User? Click Here link in the Sign In box. You will see the New Member Sign Up page. 3. Enter your [employer] Access Code exactly as it appears below. You will not need to use this code after youve completed the sign-up process. If you do not sign up before the expiration date, you must request a new code. · [employer] Access Code: [email or web address] Expires: 4/12/2018  9:52 AM 
 
4. Enter the last four digits of your Social Security Number (xxxx) and Date of Birth (mm/dd/yyyy) as indicated and click Submit. You will be taken to the next sign-up page. 5. Create a [employer] ID. This will be your [employer] login ID and cannot be changed, so think of one that is secure and easy to remember. 6. Create a [employer] password. You can change your password at any time. 7. Enter your Password Reset Question and Answer. This can be used at a later time if you forget your password. 8. Enter your e-mail address. You will receive e-mail notification when new information is available in [address] 9. Click Sign Up. You can now view and download portions of your medical record. 10. Click the Download Summary menu link to download a portable copy of your medical information.  
 
If you have questions, please visit the Frequently Asked Questions section of the [employer]. Remember, [employer] is NOT to be used for urgent needs. For medical emergencies, dial 911. Now available from your iPhone and Android! Please provide this summary of care documentation to your next provider. Your primary care clinician is listed as JOSE CARLOS GEORGE. If you have any questions after today's visit, please call [phone].

## 2018-01-17 NOTE — PROGRESS NOTES
Major Asai 71 y.o. female     Ms. 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 with negative  workup 10 years ago     Review of Systems:   CNS: No seizure syncope headaches dizziness or visual changes  Respiratory: No wheezing shortness of breath or coughing  Cardiovascular: Hypertension  Intestinal: Constipation/diarrhea  Urinary: Urinary urgency frequency with stress incontinence and urgency incontinence  Skeletal: Chronic low back pain muscle aches and pains large joint arthritis  Endocrine: No diabetes or thyroid disease  Other                                                                                  2 para 2  ×2      Allergies: Allergies   Allergen Reactions    Codeine Nausea Only     Other reaction(s): gi distress    Crestor [Rosuvastatin] Myalgia    Lipitor [Atorvastatin] Myalgia     High dose only    Morphine Other (comments)    Other Medication Other (comments)     Unable to tolerate any pain medications due to severe GI upset    Penicillins Rash     Other reaction(s): mild rash/itching  And swelling      Medications:    Current Outpatient Prescriptions   Medication Sig Dispense Refill    TOPROL XL 25 mg XL tablet TAKE 1 TABLET BY MOUTH TWICE DAILY 60 Tab 5    cyanocobalamin (VITAMIN B-12) 1,000 mcg tablet Take 1,000 mcg by mouth daily.  warfarin (COUMADIN) 2 mg tablet Take 1 and 1/2 tablets by mouth once daily or as directed by physicain 45 Tab 5    atorvastatin (LIPITOR) 40 mg tablet Take 40 mg by mouth daily.       lisinopril-hydrochlorothiazide (PRINZIDE, ZESTORETIC) 10-12.5 mg per tablet Take 1 Tab by mouth daily.  Cholecalciferol, Vitamin D3, (VITAMIN D) 2,000 unit Cap Take 1 Tab by mouth daily.  omeprazole (PRILOSEC) 20 mg capsule Take 20 mg by mouth daily.  ASCORBATE CALCIUM (VITAMIN C PO) Take 1 Tab by mouth Three (3) times a week.  cyclobenzaprine (FLEXERIL) 5 mg tablet Take 5 mg by mouth.  acetaminophen (TYLENOL) 325 mg tablet Take  by mouth every four (4) hours as needed for Pain.  krill-om-3-dha-epa-phospho-ast (MEGARED OMEGA-3 KRILL OIL) 1,000-230-60 mg cap Take  by mouth.  traMADol (ULTRAM) 50 mg tablet Take 50 mg by mouth as needed for Pain.  amitriptyline (ELAVIL) 10 mg tablet Take 10 mg by mouth nightly. Past Medical History:   Diagnosis Date    Abnormal myocardial perfusion study 07/07/2014    Mid to distal inferolateral & apical fixed defect c/w prior infarction. No ongoing ischemia. EF 62%. Distal inferolateral & apical akin. Rate-dependent LBBB & occasional PAC & PVCs w/exercise. Ex time 3 min 46 sec.  Arthritis     Broken wrist     fractured elbow also    Constipation     Crush injury 9/23/09    fall w/ secondary crush injury to a portion of the elbow w/ subsequent surgery; frozen shoulder     CVA (cerebral vascular accident) ([facility] [address]     hx of 2 small CVAs    Diarrhea     Disc disease, degenerative, cervical     Heart abnormality     Heart disease     atherosclerotic; s/p MIDCAB in 1996 w/ a lt internal mammary artery to the LAD, which was patent and nuclear study in 2007, demonstrating some apical scar w/o ischemia, for medical therapy    History of cardiac cath 06/17/2004    Small RCA - mild. LM - mild. LAD - large aneurysmal segment. Mild LAD and D2. LIMA ok. EF 45-50%. Anterolateral/apical hyk to dysk.  History of echocardiogram 07/17/2015    EF 50-55%. Apical dysk. Mild LVH  Gr 1 DDfx.   No significant valvular heart disease.  Hypercholesterolemia     Hypertension     Hypertensive cardiovascular disease     Joint pain     Joint swelling     LBP (low back pain)     Lower extremity venous duplex 07/01/2004    No DVT bilaterally. Patent R SFA w/o pseudoaneurysm.  Muscle ache     Muscle pain     Musculoskeletal chest pain     PVC (premature ventricular contraction)     Reflux     S/P CABG x 1 09/14/96    LIMA-LAD     Sinus problem     Stiff joint     Stroke [facility]) 1996/1999    [facility]roke [facility])       Past Surgical History:   Procedure Laterality Date    CARDIAC SURG PROCEDURE UNLIST      open heart surgery    HX CORONARY ARTERY BYPASS GRAFT  1996    lt internal mammary artery to lt anterior descending after a failed angioplasty    HX GYN      removal of left ovarian tumor four years ago    HX ORTHOPAEDIC  9/08    pinning of fracture in rt forearm    HX OTHER SURGICAL      bilateral TMJ surgery    HX OTHER SURGICAL  10/31/15    vagina biopsy of lesions     Family History   Problem Relation Age of Onset    Cancer Mother     Cancer Father     Diabetes Brother     Hypertension Brother         Physical Examination: Well-nourished mature female in no apparent distress      Urinalysis: ++heme/negative nitrite    Cystoscopy Report: After prepping the introitus with Betadine solution and instilling 2% lidocaine jelly intraurethrally a flexible cystoscope was passed through the urethra into the bladder revealing normal urothelium of the bladder and urethra. There are no strictures, stones, tumors, or diverticuli evident. No trabeculation evident-ureteral orifices are both slitlike in appearance with clear urine jets observed on both sides.   There is no abnormal blood vessels in the bladder wall or trigone region-no glomerulations, hemorrhages, injection, or friability evident-  Gross CMG: Bladder capacity 150 cc with detrusor contraction at great 100cc/PVR 30 cc  Speculum exam-mild anterior vaginal wall hypermobility  Verdis Goldberg test-mild DORITA with Valsalva in supine position  Bimanual examination negative for pelvic and rectal masses    Impression: Small capacity irritable bladder                         Mild stress urinary incontinence                         Urgency incontinence    Plan: 500 mg twice daily ×3 days    Treatment options discussed with patient including anticholinergic Rx and/or beta 3 agonist therapy Myrbetriq-declines both  Described/discussed Kegel exercises    Patient expresses relief at finding no significant anatomic lesions in the bladder-prefers no medical treatment for symptoms at this time    rtc 3 mo      More than 1/2 of this 15 minute visit was spent in counselling and coordination of care, as described above. Tonio Calvillo MD  -electronically signed-    PLEASE NOTE:  This document has been produced using voice recognition software. Unrecognized errors in transcription may be present.

## 2018-01-23 LAB — INR, EXTERNAL: 2.4

## 2018-01-24 ENCOUNTER — TELEPHONE ANTICOAG (OUTPATIENT)
Dept: CARDIOLOGY CLINIC | Age: 73
End: 2018-01-24

## 2018-01-24 DIAGNOSIS — Z79.01 LONG TERM CURRENT USE OF ANTICOAGULANT THERAPY: ICD-10-CM

## 2018-01-24 NOTE — PROGRESS NOTES
Verbal order and read back per Farhad Miller NP  The INR is stable and therapeutic.  Continue same dose of coumadin and recheck in 1 week  Continue Coumadin 2 mg daily except 1 mg on Mon   Patient informed of instructions, read back and verbalized understanding Bailey Renae LPN

## 2018-01-30 LAB — INR, EXTERNAL: 2.1

## 2018-01-31 ENCOUNTER — TELEPHONE ANTICOAG (OUTPATIENT)
Dept: CARDIOLOGY CLINIC | Age: 73
End: 2018-01-31

## 2018-01-31 DIAGNOSIS — Z79.01 LONG TERM CURRENT USE OF ANTICOAGULANT THERAPY: ICD-10-CM

## 2018-01-31 NOTE — PROGRESS NOTES
Verbal order and read back per Lyly Olson NP  The INR is stable and therapeutic.  Continue same dose of coumadin and recheck in 2 weeks  Continue Coumadin 2 mg daily except 1 mg on Mon   Patient informed of instructions, read back and verbalized understanding Umer Begum LPN

## 2018-02-06 LAB — INR, EXTERNAL: 2.4

## 2018-02-08 ENCOUNTER — TELEPHONE ANTICOAG (OUTPATIENT)
Dept: CARDIOLOGY CLINIC | Age: 73
End: 2018-02-08

## 2018-02-08 DIAGNOSIS — Z79.01 LONG TERM CURRENT USE OF ANTICOAGULANT THERAPY: ICD-10-CM

## 2018-02-08 NOTE — PROGRESS NOTES
Verbal order and read back per Isaiah Alexandre NP  The INR is stable and therapeutic.  Continue same dose of coumadin and recheck in 1 week  Continue Coumadin 2 mg daily except 1 mg on Mon   Patient informed of instructions, read back and verbalized understanding Morteza Lynch LPN

## 2018-02-13 LAB — INR, EXTERNAL: 2.6

## 2018-02-14 ENCOUNTER — TELEPHONE ANTICOAG (OUTPATIENT)
Dept: CARDIOLOGY CLINIC | Age: 73
End: 2018-02-14

## 2018-02-14 DIAGNOSIS — Z79.01 LONG TERM CURRENT USE OF ANTICOAGULANT THERAPY: ICD-10-CM

## 2018-02-14 NOTE — PROGRESS NOTES
Verbal order and read back per Anh Sandoval NP  The INR is stable and therapeutic. Continue same dose of coumadin and recheck in 1 week  Continue Coumadin 2 mg daily except 1 mg on Mon   No answer, left instructions on voicemail and asked patient to call office to verify receipt of message.  Regina Simon LPN

## 2018-02-20 ENCOUNTER — TELEPHONE ANTICOAG (OUTPATIENT)
Dept: CARDIOLOGY CLINIC | Age: 73
End: 2018-02-20

## 2018-02-20 DIAGNOSIS — Z79.01 LONG TERM CURRENT USE OF ANTICOAGULANT THERAPY: ICD-10-CM

## 2018-02-20 LAB — INR, EXTERNAL: 2.7

## 2018-02-20 NOTE — PROGRESS NOTES
Verbal order and read back per Cassie Huerta NP  The INR is stable and therapeutic.  Continue same dose of coumadin and recheck in 1 week

## 2018-03-01 ENCOUNTER — TELEPHONE ANTICOAG (OUTPATIENT)
Dept: CARDIOLOGY CLINIC | Age: 73
End: 2018-03-01

## 2018-03-01 DIAGNOSIS — Z79.01 LONG TERM CURRENT USE OF ANTICOAGULANT THERAPY: ICD-10-CM

## 2018-03-01 LAB — INR, EXTERNAL: 2.4

## 2018-03-01 NOTE — PROGRESS NOTES
Verbal order and read back per Verneice Loop, NP  The INR is stable and therapeutic.  Continue same dose of coumadin and recheck in 2 weeks

## 2018-03-09 ENCOUNTER — OFFICE VISIT (OUTPATIENT)
Dept: CARDIOLOGY CLINIC | Age: 73
End: 2018-03-09

## 2018-03-09 DIAGNOSIS — Z95.1 S/P CABG X 1: ICD-10-CM

## 2018-03-09 DIAGNOSIS — I11.9 BENIGN HYPERTENSIVE HEART DISEASE WITHOUT HEART FAILURE: ICD-10-CM

## 2018-03-09 DIAGNOSIS — I25.10 ATHEROSCLEROSIS OF NATIVE CORONARY ARTERY OF NATIVE HEART WITHOUT ANGINA PECTORIS: Primary | ICD-10-CM

## 2018-03-09 DIAGNOSIS — E78.5 DYSLIPIDEMIA: ICD-10-CM

## 2018-03-09 DIAGNOSIS — I44.7 LBBB (LEFT BUNDLE BRANCH BLOCK): ICD-10-CM

## 2018-03-09 NOTE — PROGRESS NOTES
1. Have you been to the ER, urgent care clinic since your last visit? Hospitalized since your last visit?no    2. Have you seen or consulted any other health care providers outside of the [address] [place] [address] since your last visit? Include any pap smears or colon screening.  no

## 2018-03-09 NOTE — PROGRESS NOTES
Review of Systems   Constitutional: Negative for chills, fever, malaise/fatigue and weight loss. Respiratory: Negative for cough, hemoptysis, shortness of breath and wheezing. Cardiovascular: Positive for chest pain and palpitations. Negative for orthopnea and leg swelling. Gastrointestinal: Positive for abdominal pain and diarrhea. Negative for blood in stool, constipation, heartburn, melena, nausea and vomiting. Musculoskeletal: Positive for falls, joint pain and myalgias. Neurological: Negative for dizziness.

## 2018-03-09 NOTE — MR AVS SNAPSHOT
[address] [place] [address] [place] [id number] 
[phone] Patient: Roberta León MRN: [id number] :1945 Visit Information Date & Time Provider Department Dept. Phone Encounter #  
 3/9/2018 10:20 AM Rah Loera DO [facility] [address] [id number] Follow-up Instructions Return in about 6 months (around 2018). Upcoming Health Maintenance Date Due Hepatitis C Screening 1945 DTaP/Tdap/Td series (1 - Tdap) 1967 FOBT Q 1 YEAR AGE 50-75 1996 ZOSTER VACCINE AGE 60> 2006 GLAUCOMA SCREENING Q2Y 2011 Pneumococcal 65+ Low/Medium Risk (1 of 2 - PCV13) 2011 MEDICARE YEARLY EXAM 2011 Influenza Age 5 to Adult 2017 BREAST CANCER SCRN MAMMOGRAM 2019 Allergies as of 3/9/2018  Review Complete On: 3/9/2018 By: Rah Loera DO Severity Noted Reaction Type Reactions Codeine High 2016   Intolerance Nausea Only Other reaction(s): gi distress Crestor [Rosuvastatin]  2014    Myalgia Lipitor [Atorvastatin]  2014   Intolerance Myalgia High dose only Morphine  2017    Other (comments) Other Medication   Intolerance Other (comments) Unable to tolerate any pain medications due to severe GI upset Penicillins  2016   Intolerance Rash Other reaction(s): mild rash/itching And swelling Current Immunizations  Never Reviewed No immunizations on file. Not reviewed this visit You Were Diagnosed With   
  
 Codes Comments Atherosclerosis of native coronary artery of native heart without angina pectoris    -  Primary ICD-10-CM: I25.10 ICD-9-CM: 414.01 S/P CABG x 1     ICD-10-CM: Z95.1 ICD-9-CM: V45.81 Benign hypertensive heart disease without heart failure     ICD-10-CM: I11.9 ICD-9-CM: 402.10 Dyslipidemia     ICD-10-CM: E78.5 ICD-9-CM: 272.4 LBBB (left bundle branch block)     ICD-10-CM: I44.7 ICD-9-CM: 426. 3 Vitals BP Pulse Height(growth percentile) Weight(growth percentile) SpO2 BMI  
 (!) 162/92 (!) 55 5' 6\" (1.676 m) 234 lb (106.1 kg) 98% 37.77 kg/m2 OB Status Smoking Status Postmenopausal Former Smoker Vitals History BMI and BSA Data Body Mass Index Body Surface Area  
 37.77 kg/m 2 2.22 m 2 Preferred Pharmacy Pharmacy Name Phone [facility] [address] [place] [facility] [phone] Your Updated Medication List  
  
   
This list is accurate as of 3/9/18 11:25 AM.  Always use your most recent med list.  
  
  
  
  
 amitriptyline 10 mg tablet Commonly known as:  ELAVIL Take 10 mg by mouth nightly. cyclobenzaprine 5 mg tablet Commonly known as:  FLEXERIL Take 5 mg by mouth. LIPITOR 40 mg tablet Generic drug:  atorvastatin Take 40 mg by mouth daily. lisinopril-hydroCHLOROthiazide 10-12.5 mg per tablet Commonly known as:  Suha Tawanna Take 1 Tab by mouth daily. PriLOSEC 20 mg capsule Generic drug:  omeprazole Take 20 mg by mouth daily. TOPROL XL 25 mg XL tablet Generic drug:  metoprolol succinate TAKE 1 TABLET BY MOUTH TWICE DAILY  
  
 traMADol 50 mg tablet Commonly known as:  ULTRAM  
Take 50 mg by mouth as needed for Pain. TYLENOL 325 mg tablet Generic drug:  acetaminophen Take  by mouth every four (4) hours as needed for Pain. VITAMIN B-12 1,000 mcg tablet Generic drug:  cyanocobalamin Take 1,000 mcg by mouth daily. VITAMIN C PO Take 1 Tab by mouth Three (3) times a week. VITAMIN D 2,000 unit Cap capsule Generic drug:  Cholecalciferol (Vitamin D3) Take 1 Tab by mouth daily. warfarin 2 mg tablet Commonly known as:  COUMADIN Take 1 and 1/2 tablets by mouth once daily or as directed by physicain We Performed the Following AMB POC EKG ROUTINE W/ 12 LEADS, INTER & REP [97589 CPT(R)] Follow-up Instructions Return in about 6 months (around 9/9/2018). To-Do List   
 03/09/2018 ECHO:  2D ECHO COMPLETE ADULT (TTE) W OR WO CONTR Introducing [facility]! Devora Arzola introduces [employer] patient portal. Now you can access parts of your medical record, email your doctor's office, and request medication refills online. 1. In your internet browser, go to [email or web address] 2. Click on the First Time User? Click Here link in the Sign In box. You will see the New Member Sign Up page. 3. Enter your [employer] Access Code exactly as it appears below. You will not need to use this code after youve completed the sign-up process. If you do not sign up before the expiration date, you must request a new code. · [employer] Access Code: [email or web address] Expires: 4/12/2018  9:52 AM 
 
4. Enter the last four digits of your Social Security Number (xxxx) and Date of Birth (mm/dd/yyyy) as indicated and click Submit. You will be taken to the next sign-up page. 5. Create a [employer] ID. This will be your [employer] login ID and cannot be changed, so think of one that is secure and easy to remember. 6. Create a [employer] password. You can change your password at any time. 7. Enter your Password Reset Question and Answer. This can be used at a later time if you forget your password. 8. Enter your e-mail address. You will receive e-mail notification when new information is available in [address] 9. Click Sign Up. You can now view and download portions of your medical record. 10. Click the Download Summary menu link to download a portable copy of your medical information. If you have questions, please visit the Frequently Asked Questions section of the [employer] website. Remember, [employer] is NOT to be used for urgent needs. For medical emergencies, dial 911. Now available from your iPhone and Android! Please provide this summary of care documentation to your next provider. Your primary care clinician is listed as JOSE CARLOS GEORGE. If you have any questions after today's visit, please call [phone].

## 2018-03-10 VITALS
SYSTOLIC BLOOD PRESSURE: 185 MMHG | BODY MASS INDEX: 37.61 KG/M2 | HEIGHT: 66 IN | DIASTOLIC BLOOD PRESSURE: 105 MMHG | WEIGHT: 234 LBS | OXYGEN SATURATION: 98 % | HEART RATE: 55 BPM

## 2018-03-13 ENCOUNTER — TELEPHONE ANTICOAG (OUTPATIENT)
Dept: CARDIOLOGY CLINIC | Age: 73
End: 2018-03-13

## 2018-03-13 DIAGNOSIS — Z79.01 LONG TERM CURRENT USE OF ANTICOAGULANT THERAPY: ICD-10-CM

## 2018-03-13 LAB — INR, EXTERNAL: 2.2

## 2018-03-13 NOTE — PROGRESS NOTES
Verbal order and read back per Lindsay Bland NP  The INR is stable and therapeutic.  Continue same dose of coumadin and recheck in 2 weeks

## 2018-03-19 RX ORDER — WARFARIN 2 MG/1
TABLET ORAL
Qty: 45 TAB | Refills: 6 | Status: SHIPPED | OUTPATIENT
Start: 2018-03-19 | End: 2019-01-17 | Stop reason: SDUPTHER

## 2018-03-22 ENCOUNTER — HOSPITAL ENCOUNTER (OUTPATIENT)
Dept: NON INVASIVE DIAGNOSTICS | Age: 73
Discharge: HOME | End: 2018-03-22
Attending: INTERNAL MEDICINE
Payer: MEDICARE

## 2018-03-22 DIAGNOSIS — I11.9 BENIGN HYPERTENSIVE HEART DISEASE WITHOUT HEART FAILURE: ICD-10-CM

## 2018-03-22 PROCEDURE — C8929 TTE W OR WO FOL WCON,DOPPLER: HCPCS

## 2018-03-22 PROCEDURE — 74011250636 HC RX REV CODE- 250/636: Performed by: INTERNAL MEDICINE

## 2018-03-22 RX ADMIN — PERFLUTREN 2 ML: 6.52 INJECTION, SUSPENSION INTRAVENOUS at 15:03

## 2018-03-27 LAB — INR, EXTERNAL: 2

## 2018-03-27 NOTE — PROGRESS NOTES
Per your last office note, \"We have not checked an echocardiogram in a while in view of the fact that S nuclear myocardial perfusion study suggested some decrease in overall LV function I like to check that and will make further recommendations after reviewing that study. \"

## 2018-03-28 ENCOUNTER — TELEPHONE ANTICOAG (OUTPATIENT)
Dept: CARDIOLOGY CLINIC | Age: 73
End: 2018-03-28

## 2018-03-28 DIAGNOSIS — I63.9 CEREBROVASCULAR ACCIDENT (CVA), UNSPECIFIED MECHANISM (HCC): ICD-10-CM

## 2018-03-28 DIAGNOSIS — Z79.01 LONG TERM CURRENT USE OF ANTICOAGULANT THERAPY: ICD-10-CM

## 2018-04-04 PROBLEM — R07.9 CHEST PAIN: Status: ACTIVE | Noted: 2018-04-04

## 2018-04-06 ENCOUNTER — TELEPHONE (OUTPATIENT)
Dept: CARDIOLOGY CLINIC | Age: 73
End: 2018-04-06

## 2018-04-06 NOTE — TELEPHONE ENCOUNTER
I called and discussed the patient's symptom complex with her that got her admitted to [facility] on April 4, 2018. She tells me that she was simply resting in the afternoon when she suddenly developed hard pounding of her heart with associated shortness of breath and some chest tightness. Her  called 46, but by the time the paramedics arrived her heart rate had slowed down. Her initial EKG in the emergency room demonstrated normal sinus rhythm with a rate of 96 and a complete left bundle branch block as well as one PVC. The left bundle branch block was not new and was seen on her EKG here in the office in March. Her troponins while in the hospital were all extremely low in the 0.05-0.06 range. While in the hospital she was on telemetry and had only occasional PVCs and did have a myocardial perfusion study which simply demonstrated an apical scar with apical akinesia, some inferior hypokinesia, and paradoxical septal motion consistent with her left bundle branch block. There was no evidence of reversible ischemia. I told her that I thought that she probably had some paroxysmal atrial fibrillation causing her shortness of breath and chest discomfort since she clearly had palpitations as the initiating problem. She has been taking Toprol 25 mg in the morning and 25 mg at night and I told her to take 50 mg in the morning and 25 mg at night and call us on Monday to let us know if she has had any further problems and if not we will call in the bigger prescription to her pharmacy.  ES

## 2018-04-07 NOTE — PROGRESS NOTES
I did call and talk to this lady on April 6, 2018 because of her admission to [facility] due to chest pain and I did discuss her echo report with her at that time.  ES

## 2018-04-09 ENCOUNTER — TELEPHONE (OUTPATIENT)
Dept: CARDIOLOGY CLINIC | Age: 73
End: 2018-04-09

## 2018-04-09 NOTE — TELEPHONE ENCOUNTER
Patient called to state she took the increase to the metoprolol on Saturday morning and 25 mg that night. Sunday morning woke up not feeling well blood pressure was 80/50. This morning Blood pressure is 102/50 and heart rate of 48. Patient has not taken her medications this morning. Verbal order and read back per Magalis Later, DO  Hold Prinzide and Toprol until blood pressure and heart rate are both up   Systolic above 161 and heart rate above 60 then she can take her medications. Continue to monitor blood pressure and heart rate and call if you have any problem or concerns.      Pt verbalized understanding

## 2018-04-10 LAB — INR, EXTERNAL: 2.2

## 2018-04-11 ENCOUNTER — TELEPHONE ANTICOAG (OUTPATIENT)
Dept: CARDIOLOGY CLINIC | Age: 73
End: 2018-04-11

## 2018-04-11 DIAGNOSIS — Z79.01 LONG TERM CURRENT USE OF ANTICOAGULANT THERAPY: ICD-10-CM

## 2018-04-11 NOTE — PROGRESS NOTES
Verbal order and read back per Sung Dopp, NP    Continue Coumadin 2 mg daily except 1 mg on Mon     Left message on machine

## 2018-04-12 ENCOUNTER — OFFICE VISIT (OUTPATIENT)
Dept: CARDIOLOGY CLINIC | Age: 73
End: 2018-04-12

## 2018-04-12 VITALS
HEART RATE: 59 BPM | OXYGEN SATURATION: 98 % | SYSTOLIC BLOOD PRESSURE: 172 MMHG | DIASTOLIC BLOOD PRESSURE: 88 MMHG | HEIGHT: 66 IN | BODY MASS INDEX: 37.12 KG/M2 | WEIGHT: 231 LBS

## 2018-04-12 DIAGNOSIS — I11.9 BENIGN HYPERTENSIVE HEART DISEASE WITHOUT HEART FAILURE: ICD-10-CM

## 2018-04-12 DIAGNOSIS — Z95.1 S/P CABG X 1: ICD-10-CM

## 2018-04-12 DIAGNOSIS — I25.10 ATHEROSCLEROSIS OF NATIVE CORONARY ARTERY OF NATIVE HEART WITHOUT ANGINA PECTORIS: ICD-10-CM

## 2018-04-12 DIAGNOSIS — I49.3 PVC (PREMATURE VENTRICULAR CONTRACTION): Primary | ICD-10-CM

## 2018-04-12 DIAGNOSIS — I44.7 LBBB (LEFT BUNDLE BRANCH BLOCK): ICD-10-CM

## 2018-04-12 DIAGNOSIS — E78.5 DYSLIPIDEMIA: ICD-10-CM

## 2018-04-12 RX ORDER — METOPROLOL SUCCINATE 25 MG/1
12.5 TABLET, EXTENDED RELEASE ORAL 2 TIMES DAILY
Qty: 1 TAB | Refills: 0
Start: 2018-04-12 | End: 2022-07-08

## 2018-04-12 RX ORDER — LISINOPRIL 10 MG/1
10 TABLET ORAL DAILY
Qty: 30 TAB | Refills: 6 | Status: SHIPPED | OUTPATIENT
Start: 2018-04-12

## 2018-04-12 RX ORDER — NITROGLYCERIN 0.4 MG/1
0.4 TABLET SUBLINGUAL
Qty: 25 TAB | Refills: 3 | Status: SHIPPED | OUTPATIENT
Start: 2018-04-12 | End: 2019-07-08 | Stop reason: SDUPTHER

## 2018-04-12 NOTE — TELEPHONE ENCOUNTER
Patient saw Pillo Schooling in the office today.       Verbal order and read back per Yunier Denise DO

## 2018-04-12 NOTE — PATIENT INSTRUCTIONS
Decrease Toprol XL to 12.5mg twice a day  Discontinue Lisinopril HCT for now  Begin Lisinopril 10mg once a day  Sublingual nitroglycerin 0.4mg as needed for chest pain.   Follow-up with Brad Girard in 2 weeks  Follow-up with Dr. Tati Phillips as scheduled and as needed

## 2018-04-12 NOTE — PROGRESS NOTES
Dionisio Avalos presents today for a post-hospital follow-up. She presented to [facility] on 4/4/18 with complaints of chest pain. She states that she was reading and had begun to doze off when she was awakened by the sensation of heart pounding accompanied by some chest pressure and shortness of breath. Her blood pressure was 188/101 and heart rate at home was 91 bpm.  Her  called EMS and she reports being given sublingual nitroglycerin which eased the pressure in her chest.  Her troponins were just slightly elevated (reference range of 0.000 to 0.045) at 0.055 to 0.06. Her NT pro-BNP was 283 (range 0 to 125). She underwent a pharmacologic nuclear stress test on 4/5/18 and it showed no evidence of reversible myocardial ischemia. Fixed defects consistent with scar inferior and apical lateral walls as described. Associated akinesis, inferior hypokinesis, and paradoxical septal motion consistent with left bundle branch block. EF estimated at 44%. She was discharged home the following day. She states that this past Sunday night, her blood pressure dropped to 88/44 and heart rate of 49 bpm.  She called the office on 4/9/18 and reported that her blood pressure was 100/65 and heart rate 48 bpm.  She was instructed to hold her Toprol XL and lisinopril HCT until her blood pressure was greater than 859 mmHg systolic and heart rate greater than 60. She has been monitoring her blood pressures at home and it has slowly increased over the past several days. She took a dose of Toprol XL on Monday night and again last night due to palpitations. She is a 71year old female with history of coronary artery disease, status post coronary artery bypass grafting surgery with left internal mammary artery to the LAD done as a MIDCAB procedure after a failed angioplasty in 1996.   Her last screening nuclear myocardial perfusion study was completed on March 21, 2017 and was an abnormal and intermediate risk pharmacologic nuclear myocardial perfusion study demonstrating a medium sized fixed defect involving the majority of the left ventricular apex and distal inferolateral wall consistent with prior infarction without reversible perfusion defects and mild decreased left ventricular function with ejection fraction of 46%. Her last echo was done on 3/22/18 and it showed an EF of 50% and grade 1 diastolic dysfunction. She is feeling better. Denies chest pain, tightness, heaviness, and palpitations. Denies shortness of breath at rest, dyspnea on exertion, orthopnea and PND. Denies abdominal bloating. Denies lightheadedness, dizziness, and syncope. Admits to bilateral ankle edema edema and claudication. Denies nausea, vomiting, diarrhea, melena, hematochezia. Denies hematuria, urgency, frequency. Denies fever, chills. PMH:  Past Medical History:   Diagnosis Date    Abnormal myocardial perfusion study 07/07/2014    Mid to distal inferolateral & apical fixed defect c/w prior infarction. No ongoing ischemia. EF 62%. Distal inferolateral & apical akin. Rate-dependent LBBB & occasional PAC & PVCs w/exercise. Ex time 3 min 46 sec.  Arthritis     Broken wrist     fractured elbow also    CAD (coronary artery disease)     Constipation     Crush injury 9/23/09    fall w/ secondary crush injury to a portion of the elbow w/ subsequent surgery; frozen shoulder     CVA (cerebral vascular accident) ([facility] [address]     hx of 2 small CVAs    Diarrhea     Disc disease, degenerative, cervical     Heart abnormality     Heart disease     atherosclerotic; s/p MIDCAB in 1996 w/ a lt internal mammary artery to the LAD, which was patent and nuclear study in 2007, demonstrating some apical scar w/o ischemia, for medical therapy    History of cardiac cath 06/17/2004    Small RCA - mild. LM - mild. LAD - large aneurysmal segment. Mild LAD and D2. LIMA ok. EF 45-50%.   Anterolateral/apical hyk to dysk.    History of echocardiogram 07/17/2015    EF 50-55%. Apical dysk. Mild LVH  Gr 1 DDfx. No significant valvular heart disease.  Hypercholesterolemia     Hypertension     Hypertensive cardiovascular disease     Joint pain     Joint swelling     LBP (low back pain)     Lower extremity venous duplex 07/01/2004    No DVT bilaterally. Patent R SFA w/o pseudoaneurysm.  Muscle ache     Muscle pain     Musculoskeletal chest pain     PVC (premature ventricular contraction)     Reflux     S/P CABG x 1 09/14/96    LIMA-LAD     Sinus problem     Stiff joint     Stroke [facility]) 1996/1999    Stroke ([address]        PSH:  Past Surgical History:   Procedure Laterality Date    CARDIAC SURG PROCEDURE UNLIST      open heart surgery    HX CORONARY ARTERY BYPASS GRAFT  1996    lt internal mammary artery to lt anterior descending after a failed angioplasty    HX GYN      removal of left ovarian tumor four years ago    HX ORTHOPAEDIC  9/08    pinning of fracture in rt forearm    HX OTHER SURGICAL      bilateral TMJ surgery    HX OTHER SURGICAL  10/31/15    vagina biopsy of lesions       MEDS:  Current Outpatient Prescriptions   Medication Sig    warfarin (COUMADIN) 2 mg tablet TAKE 1 AND 1/2 TABLETS BY MOUTH ONCE DAILY OR AS DIRECTED BY PHYSICIAN.  cyclobenzaprine (FLEXERIL) 5 mg tablet Take 5 mg by mouth as needed.  cyanocobalamin (VITAMIN B-12) 1,000 mcg tablet Take 1,000 mcg by mouth daily.  acetaminophen (TYLENOL) 325 mg tablet Take 1,000 mg by mouth as needed for Pain.  traMADol (ULTRAM) 50 mg tablet Take 50 mg by mouth as needed for Pain.  amitriptyline (ELAVIL) 10 mg tablet Take 10 mg by mouth as needed.  atorvastatin (LIPITOR) 40 mg tablet Take 40 mg by mouth daily.  lisinopril-hydrochlorothiazide (PRINZIDE, ZESTORETIC) 10-12.5 mg per tablet Take 1 Tab by mouth daily.  Cholecalciferol, Vitamin D3, (VITAMIN D) 2,000 unit Cap Take 1 Tab by mouth daily.     omeprazole (PRILOSEC) 20 mg capsule Take 20 mg by mouth daily.  ASCORBATE CALCIUM (VITAMIN C PO) Take 1 Tab by mouth Three (3) times a week.  TOPROL XL 25 mg XL tablet TAKE 1 TABLET BY MOUTH TWICE DAILY     No current facility-administered medications for this visit. Allergies and Sensitivities:  Allergies   Allergen Reactions    Codeine Nausea Only     Other reaction(s): gi distress    Crestor [Rosuvastatin] Myalgia    Lipitor [Atorvastatin] Myalgia     High dose only    Morphine Other (comments)    Other Medication Other (comments)     Unable to tolerate any pain medications due to severe GI upset    Penicillins Rash     Other reaction(s): mild rash/itching  And swelling       Family History:  Family History   Problem Relation Age of Onset    Cancer Mother     Cancer Father     Diabetes Brother     Hypertension Brother        Social History:  She  reports that she quit smoking about 28 years ago. She has a 15.00 pack-year smoking history. She has never used smokeless tobacco.  She  reports that she does not drink alcohol. Physical:  Visit Vitals    /88    Pulse (!) 59    Ht 5' 6\" (1.676 m)    Wt 104.8 kg (231 lb)    SpO2 98%    BMI 37.28 kg/m2         Exam:  Neck:  Supple, no JVD, no carotid bruits  CV:  Normal S1 and  S2, no murmurs, rubs, or gallops noted  Lungs:  Clear to ausculation throughout, no wheezes or rales  Abd:  Soft, non-tender, non-distended with good bowel sounds. No hepatosplenomegaly  Extremities:  Trace to 1+ edema around her ankles      Data:  EKG:  Read by Suzanne Anthony MD - Sinus bradycardia at 59 bpm.  CBBB.       LABS:  Lab Results   Component Value Date/Time    Sodium 145 04/05/2018 09:17 AM    Potassium 4.1 04/05/2018 09:17 AM    Chloride 107 04/05/2018 09:17 AM    CO2 31 04/05/2018 09:17 AM    Glucose 111 (H) 04/05/2018 09:17 AM    BUN 15 04/05/2018 09:17 AM    Creatinine 0.9 04/05/2018 09:17 AM     Lab Results   Component Value Date/Time    Cholesterol, total 158 01/12/2018 10:22 AM    HDL Cholesterol 67 (H) 01/12/2018 10:22 AM    LDL, calculated 65.6 01/12/2018 10:22 AM    Triglyceride 127 01/12/2018 10:22 AM    CHOL/HDL Ratio 2.4 01/12/2018 10:22 AM     Lab Results   Component Value Date/Time    ALT (SGPT) 32 01/12/2018 10:22 AM         Impression/Plan:  1.  CAD, s/p CABG x 1 in 1996 (done as a MIDCAB after a failed angioplasty), stable  2. Dyslipidemia  3. Chronic LBBB  4.  Obesity  5. HCVD, blood pressure elevated    Mrs. Fredy Tabor was seen today for a post-hospital follow-up. She was recently hospitalized at [facility] for complaints of chest pressure and she ruled out for MI. A nuclear stress test that was performed showed no evidence of reversible myocardial ischemia. A few days after being discharged, she experienced hypotension at home with systolic pressures in the 80's. She called to report other blood pressure readings around 174 mmHg systolic. She was instructed to hold her Toprol XL and lisinopril/HCT until her blood pressure was greater than 100 mmHg and heart rate greater than 60 bpm.  She was then scheduled to see me. Today, she states that she is feeling better. She brought a blood pressure diary and it showed that her blood pressures have begun to increase over the past several days. Most readings however, were in the 915'Y systolic and 60'O to 26'A diastolic. She states that she took a dose of Toprol this past Monday and again last night for palpitations. Her blood pressure today was quite elevated. Lying 172/88, sitting 168/92, and standing 170/90. Her heart rate was 63 bpm.  Breath sounds are clear and she has trace to 1+ edema around her ankles which she states has been occurring for some time. She was instructed to discontinue her lisinopril HCT for now. Will restart her on lisinopril 10mg daily and she was also instructed to decrease her Toprol XL to 12.5mg BID.   She has been scheduled to return for follow-up with me in 2 weeks. She is to continue monitoring her blood pressures at home and record the readings. She knows to call the office if she has any cardiac problems or concerns prior to her next scheduled visit. Prescriptions for lisinopril 10mg daily and sublingual nitroglycerin were e-scribed to her pharmacy. Medication teaching done re:  CHF and use of nitroglycerin. She will follow-up with Dr. Isha Odell as scheduled and as needed. She will continue to monitor her INRs at home and she has been therapeutic. Yarely Vazquez MSN, FNP-BC      Please note:  Portions of this chart were created with Dragon medical speech to text program.  Unrecognized errors may be present.

## 2018-04-12 NOTE — MR AVS SNAPSHOT
[address] [place] [address] 
[phone] Patient: Amanda Regan MRN: [id number] :1945 Visit Information Date & Time Provider Department Dept. Phone Encounter #  
 2018 11:00 AM Sari Gallardo NP [facility] [address] [id number] Your Appointments 2018 10:30 AM  
Follow Up with Sari Gallardo NP [facility] ([address] [place] [address]) Appt Note: 2 week f/u after meds adjusted [place] [phone]  
[phone] 2300 [facility] [address] 2018 10:20 AM  
Follow Up with Anais Alvarado DO [facility] ([address] [place] [address]) Appt Note: 6 month f/up [place] [phone]  
[phone] 2300 [facility] [address] Upcoming Health Maintenance Date Due Hepatitis C Screening 1945 DTaP/Tdap/Td series (1 - Tdap) 1967 FOBT Q 1 YEAR AGE 50-75 1996 ZOSTER VACCINE AGE 60> 2006 GLAUCOMA SCREENING Q2Y 2011 Pneumococcal 65+ Low/Medium Risk (1 of 2 - PCV13) 2011 Influenza Age 5 to Adult 2017 MEDICARE YEARLY EXAM 3/14/2018 BREAST CANCER SCRN MAMMOGRAM 2019 Allergies as of 2018  Review Complete On: 2018 By: Fermin Krishnamurthy LPN Severity Noted Reaction Type Reactions Codeine High 2016   Intolerance Nausea Only Other reaction(s): gi distress Crestor [Rosuvastatin]  2014    Myalgia Lipitor [Atorvastatin]  2014   Intolerance Myalgia High dose only Morphine  2017    Other (comments) Other Medication   Intolerance Other (comments) Unable to tolerate any pain medications due to severe GI upset Penicillins  07/26/2016   Intolerance Rash Other reaction(s): mild rash/itching And swelling Current Immunizations  Never Reviewed No immunizations on file. Not reviewed this visit You Were Diagnosed With   
  
 Codes Comments PVC (premature ventricular contraction)    -  Primary ICD-10-CM: I49.3 ICD-9-CM: 427.69 Benign hypertensive heart disease without heart failure     ICD-10-CM: I11.9 ICD-9-CM: 402.10 Atherosclerosis of native coronary artery of native heart without angina pectoris     ICD-10-CM: I25.10 ICD-9-CM: 414.01 S/P CABG x 1     ICD-10-CM: Z95.1 ICD-9-CM: V45.81 Dyslipidemia     ICD-10-CM: E78.5 ICD-9-CM: 272.4 LBBB (left bundle branch block)     ICD-10-CM: I44.7 ICD-9-CM: 426. 3 Vitals BP Pulse Height(growth percentile) Weight(growth percentile) SpO2 BMI  
 172/88 (!) 59 5' 6\" (1.676 m) 231 lb (104.8 kg) 98% 37.28 kg/m2 OB Status Smoking Status Postmenopausal Former Smoker Vitals History BMI and BSA Data Body Mass Index Body Surface Area  
 37.28 kg/m 2 2.21 m 2 Preferred Pharmacy Pharmacy Name Phone [facility] [address] [place] [facility] [phone] Your Updated Medication List  
  
   
This list is accurate as of 4/12/18 12:04 PM.  Always use your most recent med list.  
  
  
  
  
 amitriptyline 10 mg tablet Commonly known as:  ELAVIL Take 10 mg by mouth as needed. cyclobenzaprine 5 mg tablet Commonly known as:  FLEXERIL Take 5 mg by mouth as needed. LIPITOR 40 mg tablet Generic drug:  atorvastatin Take 40 mg by mouth daily. lisinopril-hydroCHLOROthiazide 10-12.5 mg per tablet Commonly known as:  Buddy Flaquito Take 1 Tab by mouth daily. PriLOSEC 20 mg capsule Generic drug:  omeprazole Take 20 mg by mouth daily. TOPROL XL 25 mg XL tablet Generic drug:  metoprolol succinate TAKE 1 TABLET BY MOUTH TWICE DAILY  
  
 traMADol 50 mg tablet Commonly known as:  ULTRAM  
Take 50 mg by mouth as needed for Pain. TYLENOL 325 mg tablet Generic drug:  acetaminophen Take 1,000 mg by mouth as needed for Pain. VITAMIN B-12 1,000 mcg tablet Generic drug:  cyanocobalamin Take 1,000 mcg by mouth daily. VITAMIN C PO Take 1 Tab by mouth Three (3) times a week. VITAMIN D 2,000 unit Cap capsule Generic drug:  Cholecalciferol (Vitamin D3) Take 1 Tab by mouth daily. warfarin 2 mg tablet Commonly known as:  COUMADIN  
TAKE 1 AND 1/2 TABLETS BY MOUTH ONCE DAILY OR AS DIRECTED BY PHYSICIAN. We Performed the Following AMB POC EKG ROUTINE W/ 12 LEADS, INTER & REP [20084 CPT(R)] Patient Instructions Decrease Toprol XL to 12.5mg twice a day Discontinue Lisinopril HCT for now Begin Lisinopril 10mg once a day Sublingual nitroglycerin 0.4mg as needed for chest pain. Follow-up with Dominick Eaton in 2 weeks Follow-up with Dr. Radha Smalls as scheduled and as needed Introducing [facility]! [employer] introduces [employer] patient portal. Now you can access parts of your medical record, email your doctor's office, and request medication refills online. 1. In your internet browser, go to [email or web address] 2. Click on the First Time User? Click Here link in the Sign In box. You will see the New Member Sign Up page. 3. Enter your [employer] Access Code exactly as it appears below. You will not need to use this code after youve completed the sign-up process. If you do not sign up before the expiration date, you must request a new code. · [employer] Access Code: [email or web address] Expires: 7/11/2018 12:02 PM 
 
4. Enter the last four digits of your Social Security Number (xxxx) and Date of Birth (mm/dd/yyyy) as indicated and click Submit. You will be taken to the next sign-up page. 5. Create a [employer] ID. This will be your [employer] login ID and cannot be changed, so think of one that is secure and easy to remember. 6. Create a [employer] password. You can change your password at any time. 7. Enter your Password Reset Question and Answer. This can be used at a later time if you forget your password. 8. Enter your e-mail address. You will receive e-mail notification when new information is available in [address] 9. Click Sign Up. You can now view and download portions of your medical record. 10. Click the Download Summary menu link to download a portable copy of your medical information. If you have questions, please visit the Frequently Asked Questions section of the [employer] website. Remember, [employer] is NOT to be used for urgent needs. For medical emergencies, dial 911. Now available from your iPhone and Android! Please provide this summary of care documentation to your next provider. Your primary care clinician is listed as JOSE CARLOS GEORGE. If you have any questions after today's visit, please call [phone].

## 2018-04-24 ENCOUNTER — TELEPHONE ANTICOAG (OUTPATIENT)
Dept: CARDIOLOGY CLINIC | Age: 73
End: 2018-04-24

## 2018-04-24 DIAGNOSIS — Z79.01 LONG TERM CURRENT USE OF ANTICOAGULANT THERAPY: ICD-10-CM

## 2018-04-24 DIAGNOSIS — I63.9 CEREBROVASCULAR ACCIDENT (CVA), UNSPECIFIED MECHANISM (HCC): ICD-10-CM

## 2018-04-24 LAB — INR, EXTERNAL: 2.1

## 2018-04-24 NOTE — PROGRESS NOTES
Verbal order and read back per Rhys Bae NP  Patient is within therapeutic range  Continue Coumadin 2 mg daily except 1 mg on Mon   Recheck in 2 weeks  This has been fully explained to the patient, who indicates understanding.

## 2018-04-26 ENCOUNTER — OFFICE VISIT (OUTPATIENT)
Dept: CARDIOLOGY CLINIC | Age: 73
End: 2018-04-26

## 2018-04-26 VITALS
OXYGEN SATURATION: 98 % | HEIGHT: 66 IN | SYSTOLIC BLOOD PRESSURE: 150 MMHG | WEIGHT: 236 LBS | BODY MASS INDEX: 37.93 KG/M2 | HEART RATE: 64 BPM | DIASTOLIC BLOOD PRESSURE: 84 MMHG

## 2018-04-26 DIAGNOSIS — R00.2 PALPITATIONS: Primary | ICD-10-CM

## 2018-04-26 DIAGNOSIS — I11.9 BENIGN HYPERTENSIVE HEART DISEASE WITHOUT HEART FAILURE: ICD-10-CM

## 2018-04-26 DIAGNOSIS — I25.10 ATHEROSCLEROSIS OF NATIVE CORONARY ARTERY OF NATIVE HEART WITHOUT ANGINA PECTORIS: ICD-10-CM

## 2018-04-26 DIAGNOSIS — E78.5 DYSLIPIDEMIA: ICD-10-CM

## 2018-04-26 DIAGNOSIS — Z95.1 S/P CABG X 1: ICD-10-CM

## 2018-04-26 PROBLEM — E66.01 SEVERE OBESITY (BMI 35.0-39.9) WITH COMORBIDITY (HCC): Status: ACTIVE | Noted: 2018-04-26

## 2018-04-26 NOTE — PATIENT INSTRUCTIONS
Continue present medication regimen  Continue monitoring blood pressures at home and record  Follow-up with Dr. Marcello Adams and as needed

## 2018-04-26 NOTE — PROGRESS NOTES
1. Have you been to the ER, urgent care clinic since your last visit? Hospitalized since your last visit? No    2. Have you seen or consulted any other health care providers outside of the [address] [place] [address] since your last visit? Include any pap smears or colon screening.  No

## 2018-04-26 NOTE — MR AVS SNAPSHOT
[address] [place] [address] [address] [place] [address] 
[phone] Patient: Tavia Gutierres MRN: [id number] :1945 Visit Information Date & Time Provider Department Dept. Phone Encounter #  
 2018 10:30 AM Yunier Lees NP [facility] [address] [id number] Your Appointments 2018 10:20 AM  
Follow Up with Emerald Rutherford DO [facility] ([address]) Appt Note: 6 month f/up [place] [phone] [phone] [address] Upcoming Health Maintenance Date Due Hepatitis C Screening 1945 DTaP/Tdap/Td series (1 - Tdap) 1967 FOBT Q 1 YEAR AGE 50-75 1996 ZOSTER VACCINE AGE 60> 2006 GLAUCOMA SCREENING Q2Y 2011 Pneumococcal 65+ Low/Medium Risk (1 of 2 - PCV13) 2011 Influenza Age 5 to Adult 2017 MEDICARE YEARLY EXAM 3/14/2018 BREAST CANCER SCRN MAMMOGRAM 2019 Allergies as of 2018  Review Complete On: 2018 By: Yunier Lees NP Severity Noted Reaction Type Reactions Codeine High 2016   Intolerance Nausea Only Other reaction(s): gi distress Crestor [Rosuvastatin]  2014    Myalgia Lipitor [Atorvastatin]  2014   Intolerance Myalgia High dose only Morphine  2017    Other (comments) Other Medication   Intolerance Other (comments) Unable to tolerate any pain medications due to severe GI upset Penicillins  2016   Intolerance Rash Other reaction(s): mild rash/itching And swelling Current Immunizations  Never Reviewed No immunizations on file. Not reviewed this visit You Were Diagnosed With   
  
 Codes Comments Palpitations    -  Primary ICD-10-CM: R00.2 ICD-9-CM: 785.1 Benign hypertensive heart disease without heart failure     ICD-10-CM: I11.9 ICD-9-CM: 402.10 Atherosclerosis of native coronary artery of native heart without angina pectoris     ICD-10-CM: I25.10 ICD-9-CM: 414.01 S/P CABG x 1     ICD-10-CM: Z95.1 ICD-9-CM: V45.81 Dyslipidemia     ICD-10-CM: E78.5 ICD-9-CM: 272.4 Vitals BP Pulse Height(growth percentile) Weight(growth percentile) SpO2 BMI  
 150/84 64 5' 6\" (1.676 m) 236 lb (107 kg) 98% 38.09 kg/m2 OB Status Smoking Status Postmenopausal Former Smoker Vitals History BMI and BSA Data Body Mass Index Body Surface Area 38.09 kg/m 2 2.23 m 2 Preferred Pharmacy Pharmacy Name Phone [facility] [address] [place], [place] [phone] Your Updated Medication List  
  
   
This list is accurate as of 4/26/18 10:56 AM.  Always use your most recent med list.  
  
  
  
  
 amitriptyline 10 mg tablet Commonly known as:  ELAVIL Take 10 mg by mouth as needed. cyclobenzaprine 5 mg tablet Commonly known as:  FLEXERIL Take 5 mg by mouth as needed. LIPITOR 40 mg tablet Generic drug:  atorvastatin Take 40 mg by mouth daily. lisinopril 10 mg tablet Commonly known as:  Gui Peek Take 1 Tab by mouth daily. metoprolol succinate 25 mg XL tablet Commonly known as:  TOPROL XL Take 0.5 Tabs by mouth two (2) times a day. nitroglycerin 0.4 mg SL tablet Commonly known as:  NITROSTAT  
1 Tab by SubLINGual route every five (5) minutes as needed for Chest Pain. PriLOSEC 20 mg capsule Generic drug:  omeprazole Take 20 mg by mouth daily. traMADol 50 mg tablet Commonly known as:  ULTRAM  
Take 50 mg by mouth as needed for Pain. TYLENOL 325 mg tablet Generic drug:  acetaminophen Take 1,000 mg by mouth as needed for Pain. VITAMIN B-12 1,000 mcg tablet Generic drug:  cyanocobalamin Take 1,000 mcg by mouth daily. VITAMIN C PO Take 1 Tab by mouth Three (3) times a week. VITAMIN D 2,000 unit Cap capsule Generic drug:  Cholecalciferol (Vitamin D3) Take 1 Tab by mouth daily. warfarin 2 mg tablet Commonly known as:  COUMADIN  
TAKE 1 AND 1/2 TABLETS BY MOUTH ONCE DAILY OR AS DIRECTED BY PHYSICIAN. To-Do List   
 05/01/2018 12:00 PM  
(Arrive by 11:30 AM) [place] [address] ([phone]) For Lymphedema patients, please wear loose fitting clothes. Please arrive 30 minutes prior to appointment to register 05/07/2018 3:00 PM  
(Arrive by 2:30 PM) [place] [address] ([phone]) For Lymphedema patients, please wear loose fitting clothes. Please arrive 30 minutes prior to appointment to register 05/09/2018 4:00 PM  
(Arrive by 3:30 PM) [place] [address] ([phone]) For Lymphedema patients, please wear loose fitting clothes. Please arrive 30 minutes prior to appointment to register 05/14/2018 4:00 PM  
(Arrive by 3:30 PM) [place] [address] ([phone]) For Lymphedema patients, please wear loose fitting clothes. Please arrive 30 minutes prior to appointment to register 05/16/2018 3:00 PM  
(Arrive by 2:30 PM) Appointment [place] [address] ([phone]) For Lymphedema patients, please wear loose fitting clothes. Please arrive 30 minutes prior to appointment to register 05/18/2018 10:30 AM  
(Arrive by 10:00 AM) Appointment with Jane Jones PT at [place] [address] ([phone]) For Lymphedema patients, please wear loose fitting clothes. Please arrive 30 minutes prior to appointment to register 05/21/2018 3:00 PM  
(Arrive by 2:30 PM) Appointment with Arnaldo Wen PTA at [place] [address] ([phone]) For Lymphedema patients, please wear loose fitting clothes. Please arrive 30 minutes prior to appointment to register 05/23/2018 3:00 PM  
(Arrive by 2:30 PM) Appointment with Arnaldo Wen PTA at [place] [address] ([phone]) For Lymphedema patients, please wear loose fitting clothes. Please arrive 30 minutes prior to appointment to register 05/25/2018 2:30 PM  
(Arrive by 2:00 PM) Appointment with Arnaldo Wen PTA at [place] [address] ([phone]) For Lymphedema patients, please wear loose fitting clothes. Please arrive 30 minutes prior to appointment to register 05/29/2018 3:00 PM  
(Arrive by 2:30 PM) Appointment with Yobany Jenkins PT at [place] [address] ([phone]) For Lymphedema patients, please wear loose fitting clothes. Please arrive 30 minutes prior to appointment to register 05/31/2018 2:30 PM  
(Arrive by 2:00 PM) Appointment with Arnaldo Wen PTA at [place] [address] ([phone]) For Lymphedema patients, please wear loose fitting clothes. Please arrive 30 minutes prior to appointment to register 06/04/2018 3:00 PM  
(Arrive by 2:30 PM) Appointment with Arnaldo Wen PTA at [place] [address] ([phone]) For Lymphedema patients, please wear loose fitting clothes. Please arrive 30 minutes prior to appointment to register Patient Instructions Continue present medication regimen Continue monitoring blood pressures at home and record Follow-up with Dr. Wilfredo Beaver and as needed Introducing [facility]! [facility] introduces [employer] patient portal. Now you can access parts of your medical record, email your doctor's office, and request medication refills online. 1. In your internet browser, go to [email or web address] 2. Click on the First Time User? Click Here link in the Sign In box. You will see the New Member Sign Up page. 3. Enter your [employer] Access Code exactly as it appears below. You will not need to use this code after youve completed the sign-up process. If you do not sign up before the expiration date, you must request a new code. · [employer] Access Code: [email or web address] Expires: 7/11/2018 12:02 PM 
 
4. Enter the last four digits of your Social Security Number (xxxx) and Date of Birth (mm/dd/yyyy) as indicated and click Submit. You will be taken to the next sign-up page. 5. Create a [employer] ID. This will be your [employer] login ID and cannot be changed, so think of one that is secure and easy to remember. 6. Create a [employer] password. You can change your password at any time. 7. Enter your Password Reset Question and Answer. This can be used at a later time if you forget your password. 8. Enter your e-mail address. You will receive e-mail notification when new information is available in [address] 9. Click Sign Up. You can now view and download portions of your medical record. 10. Click the Download Summary menu link to download a portable copy of your medical information. If you have questions, please visit the Frequently Asked Questions section of the [employer] website. Remember, [employer] is NOT to be used for urgent needs. For medical emergencies, dial 911. Now available from your iPhone and Android! Please provide this summary of care documentation to your next provider. Your primary care clinician is listed as JOSE CARLOS GEORGE. If you have any questions after today's visit, please call [phone].

## 2018-04-26 NOTE — PROGRESS NOTES
Carroll Montemayor presents today for blood pressure and heart rate follow-up. When I saw her on 4/12/18, her lisinopril HCT was discontinued and she was switched to plain lisinopril 10mg daily. Her Toprol XL was restarted at 12.5mg BID. She has been monitoring her blood pressure and heart rate at home and she brought a diary with her today. Her blood pressures are well controlled with systolic pressures being between 106 mmHg up to 142 mmHg. On average, her systolic pressures have been between 110 and 130 mmHg. Her heart rate has been in the mid to upper 40's up to the 60's and she has been asymptomatic. She is a 71year old female with history of coronary artery disease, status post coronary artery bypass grafting surgery with left internal mammary artery to the LAD done as a MIDCAB procedure after a failed angioplasty in 1996. Her last screening nuclear myocardial perfusion study was completed on March 21, 2017 and was an abnormal and intermediate risk pharmacologic nuclear myocardial perfusion study demonstrating a medium sized fixed defect involving the majority of the left ventricular apex and distal inferolateral wall consistent with prior infarction without reversible perfusion defects and mild decreased left ventricular function with ejection fraction of 46%. Her last echo was done on 3/22/18 and it showed an EF of 50% and grade 1 diastolic dysfunction. She presented to [facility] on 4/4/18 with complaints of chest pain. She states that she was reading and had begun to doze off when she was awakened by the sensation of heart pounding accompanied by some chest pressure and shortness of breath. Her blood pressure was 188/101 and heart rate at home was 91 bpm.  Her  called EMS and she reports being given sublingual nitroglycerin which eased the pressure in her chest.  Her troponins were just slightly elevated (reference range of 0.000 to 0.045) at 0.055 to 0.06. Her NT pro-BNP was 283 (range 0 to 125). She underwent a pharmacologic nuclear stress test on 4/5/18 and it showed no evidence of reversible myocardial ischemia. Fixed defects consistent with scar inferior and apical lateral walls as described. Associated akinesis, inferior hypokinesis, and paradoxical septal motion consistent with left bundle branch block. EF estimated at 44%. She was discharged home the following day. 2 days after being discharged home, she experienced episodes of hypotension and bradycardia with a blood pressure of 88/44 and heart rate of 49 bpm.  She called the office on 4/9/18 and reported that her blood pressure was 100/65 and heart rate 48 bpm.  She was instructed to hold her Toprol XL and lisinopril HCT until her blood pressure was greater than 202 mmHg systolic and heart rate greater than 60. She is feeling better. Denies chest pain, tightness, heaviness, and palpitations. Denies shortness of breath at rest, dyspnea on exertion, orthopnea and PND. Denies abdominal bloating. Denies lightheadedness, dizziness, and syncope. Admits to bilateral ankle edema edema and claudication. Denies nausea, vomiting, diarrhea, melena, hematochezia. Denies hematuria, urgency, frequency. Denies fever, chills. PMH:  Past Medical History:   Diagnosis Date    Abnormal myocardial perfusion study 07/07/2014    Mid to distal inferolateral & apical fixed defect c/w prior infarction. No ongoing ischemia. EF 62%. Distal inferolateral & apical akin. Rate-dependent LBBB & occasional PAC & PVCs w/exercise. Ex time 3 min 46 sec.        Arthritis     Broken wrist     fractured elbow also    CAD (coronary artery disease)     Constipation     Crush injury 9/23/09    fall w/ secondary crush injury to a portion of the elbow w/ subsequent surgery; frozen shoulder     CVA (cerebral vascular accident) ([facility] [address]     hx of 2 small CVAs    Diarrhea     Disc disease, degenerative, cervical     Heart abnormality     Heart disease     atherosclerotic; s/p MIDCAB in 1996 w/ a lt internal mammary artery to the LAD, which was patent and nuclear study in 2007, demonstrating some apical scar w/o ischemia, for medical therapy    History of cardiac cath 06/17/2004    Small RCA - mild. LM - mild. LAD - large aneurysmal segment. Mild LAD and D2. LIMA ok. EF 45-50%. Anterolateral/apical hyk to dysk.  History of echocardiogram 07/17/2015    EF 50-55%. Apical dysk. Mild LVH  Gr 1 DDfx. No significant valvular heart disease.  Hypercholesterolemia     Hypertension     Hypertensive cardiovascular disease     Joint pain     Joint swelling     LBP (low back pain)     Lower extremity venous duplex 07/01/2004    No DVT bilaterally. Patent R SFA w/o pseudoaneurysm.  Muscle ache     Muscle pain     Musculoskeletal chest pain     PVC (premature ventricular contraction)     Reflux     S/P CABG x 1 09/14/96    LIMA-LAD     Sinus problem     Stiff joint     Stroke ([address] 1996/1999    Stroke ([address]        PSH:  Past Surgical History:   Procedure Laterality Date    CARDIAC SURG PROCEDURE UNLIST      open heart surgery    HX CORONARY ARTERY BYPASS GRAFT  1996    lt internal mammary artery to lt anterior descending after a failed angioplasty    HX GYN      removal of left ovarian tumor four years ago    HX ORTHOPAEDIC  9/08    pinning of fracture in rt forearm    HX OTHER SURGICAL      bilateral TMJ surgery    HX OTHER SURGICAL  10/31/15    vagina biopsy of lesions       MEDS:  Current Outpatient Prescriptions   Medication Sig    metoprolol succinate (TOPROL XL) 25 mg XL tablet Take 0.5 Tabs by mouth two (2) times a day.  nitroglycerin (NITROSTAT) 0.4 mg SL tablet 1 Tab by SubLINGual route every five (5) minutes as needed for Chest Pain.  lisinopril (PRINIVIL, ZESTRIL) 10 mg tablet Take 1 Tab by mouth daily.     warfarin (COUMADIN) 2 mg tablet TAKE 1 AND 1/2 TABLETS BY MOUTH ONCE DAILY OR AS DIRECTED BY PHYSICIAN.  cyclobenzaprine (FLEXERIL) 5 mg tablet Take 5 mg by mouth as needed.  cyanocobalamin (VITAMIN B-12) 1,000 mcg tablet Take 1,000 mcg by mouth daily.  acetaminophen (TYLENOL) 325 mg tablet Take 1,000 mg by mouth as needed for Pain.  traMADol (ULTRAM) 50 mg tablet Take 50 mg by mouth as needed for Pain.  amitriptyline (ELAVIL) 10 mg tablet Take 10 mg by mouth as needed.  atorvastatin (LIPITOR) 40 mg tablet Take 40 mg by mouth daily.  Cholecalciferol, Vitamin D3, (VITAMIN D) 2,000 unit Cap Take 1 Tab by mouth daily.  omeprazole (PRILOSEC) 20 mg capsule Take 20 mg by mouth daily.  ASCORBATE CALCIUM (VITAMIN C PO) Take 1 Tab by mouth Three (3) times a week. No current facility-administered medications for this visit. Allergies and Sensitivities:  Allergies   Allergen Reactions    Codeine Nausea Only     Other reaction(s): gi distress    Crestor [Rosuvastatin] Myalgia    Lipitor [Atorvastatin] Myalgia     High dose only    Morphine Other (comments)    Other Medication Other (comments)     Unable to tolerate any pain medications due to severe GI upset    Penicillins Rash     Other reaction(s): mild rash/itching  And swelling       Family History:  Family History   Problem Relation Age of Onset    Cancer Mother     Cancer Father     Diabetes Brother     Hypertension Brother        Social History:  She  reports that she quit smoking about 28 years ago. She has a 15.00 pack-year smoking history. She has never used smokeless tobacco.  She  reports that she does not drink alcohol. Physical:  Visit Vitals    /84    Pulse 64    Ht 5' 6\" (1.676 m)    Wt 107 kg (236 lb)    SpO2 98%    BMI 38.09 kg/m2         Exam:  Neck:  Supple, no JVD, no carotid bruits  CV:  Normal S1 and  S2, no murmurs, rubs, or gallops noted  Lungs:  Clear to ausculation throughout, no wheezes or rales  Abd:  Soft, non-tender, non-distended with good bowel sounds.   No hepatosplenomegaly  Extremities:   1+ edema around her ankles      Data:  EKG:  Not done today      LABS:  Lab Results   Component Value Date/Time    Sodium 145 04/05/2018 09:17 AM    Potassium 4.1 04/05/2018 09:17 AM    Chloride 107 04/05/2018 09:17 AM    CO2 31 04/05/2018 09:17 AM    Glucose 111 (H) 04/05/2018 09:17 AM    BUN 15 04/05/2018 09:17 AM    Creatinine 0.9 04/05/2018 09:17 AM     Lab Results   Component Value Date/Time    Cholesterol, total 158 01/12/2018 10:22 AM    HDL Cholesterol 67 (H) 01/12/2018 10:22 AM    LDL, calculated 65.6 01/12/2018 10:22 AM    Triglyceride 127 01/12/2018 10:22 AM    CHOL/HDL Ratio 2.4 01/12/2018 10:22 AM     Lab Results   Component Value Date/Time    ALT (SGPT) 32 01/12/2018 10:22 AM         Impression/Plan:  1.  CAD, s/p CABG x 1 in 1996 (done as a MIDCAB after a failed angioplasty), stable  2. Dyslipidemia  3. Chronic LBBB  4.  Obesity  5. HCVD, blood pressure slightly elevated here in the office but well controlled at home, likely some component of White Coat Syndrome    Mrs. Hermes Zambrano was seen today for blood pressure follow-up. Her lisinopril HCT was discontinued due to hypotension and she was started on lisinopril 10mg once a day. She was also bradycardic and her Toprol XL was decreased to 12.5mg BID. She has been monitoring her blood pressures at home and her systolic blood pressures have mainly been between 110 and 130 mmHg. Her heart rates have been in the mid to upper 40s to 60's. Her blood pressure today was 150/84. She likely has a component of White Coat Syndrome. Her blood pressure readings at home have been consistently well controlled since her last visit. Her heart rate was 64 bpm.  Breath sounds are clear and she has 1+ edema around her ankles which she states has been occurring for some time. No further adjustments were made to her medication regimen today.   She was asked to continue monitoring her blood pressures and to call us if her blood pressure remains consistently above 140 mmHg. She will follow-up with Dr. Austin Newton as scheduled and as needed. She will continue to monitor her INRs at home and she has been therapeutic with her last INR being 2.1 on 4/25/18. Osito Browning MSN, FNP-BC      Please note:  Portions of this chart were created with Dragon medical speech to text program.  Unrecognized errors may be present.

## 2018-04-30 NOTE — PROGRESS NOTES
Verbal order and read back per Anh Sandoval NP   Continue Coumadin 2 mg daily except 1 mg on Mon   Recheck in 2 weeks  This has been fully explained to the patient, who indicates understanding.
Contraindicated

## 2018-05-08 LAB — INR, EXTERNAL: 2.1

## 2018-05-09 ENCOUNTER — TELEPHONE ANTICOAG (OUTPATIENT)
Dept: CARDIOLOGY CLINIC | Age: 73
End: 2018-05-09

## 2018-05-09 DIAGNOSIS — I63.9 CEREBROVASCULAR ACCIDENT (CVA), UNSPECIFIED MECHANISM (HCC): ICD-10-CM

## 2018-05-09 DIAGNOSIS — Z79.01 LONG TERM CURRENT USE OF ANTICOAGULANT THERAPY: ICD-10-CM

## 2018-05-09 NOTE — PROGRESS NOTES
Verbal order and read back per Matias Dee NP  Patient is within therapeutic range  Continue Coumadin 2 mg daily except 1 mg on Mon   Recheck 2 weeks      Left a message on patient's voicemail with INR dosing instructions and recheck timeframe/date. Asked patient to return my call to confirm receipt of the message.

## 2018-05-18 ENCOUNTER — TELEPHONE (OUTPATIENT)
Dept: CARDIOLOGY CLINIC | Age: 73
End: 2018-05-18

## 2018-05-18 DIAGNOSIS — R00.2 PALPITATIONS: Primary | ICD-10-CM

## 2018-05-18 NOTE — TELEPHONE ENCOUNTER
Patient called stating she woke up two nights with palpitations. One night she felt her \"heart pounding\" . She states her heart rate was 111, /88. She took and additional Toprol 12.5 mg at 0205 and her palpitations resolved. Patient had another episode on 5/14 of heart \"pounding\". Hear rate was 103 bp 183/91. Discussed with Guido Joiner NP. Patient can take an extra Toprol 12.5 mg when heart rate is greater than 100. 48 hour holter monitor for palps. Verbal order and read back per Que Adams NP    Left message on answering machine for patient to call back.

## 2018-05-22 LAB — INR, EXTERNAL: 3

## 2018-05-24 ENCOUNTER — HOSPITAL ENCOUNTER (OUTPATIENT)
Dept: NON INVASIVE DIAGNOSTICS | Age: 73
Discharge: HOME | End: 2018-05-24
Attending: NURSE PRACTITIONER

## 2018-05-24 ENCOUNTER — TELEPHONE ANTICOAG (OUTPATIENT)
Dept: CARDIOLOGY CLINIC | Age: 73
End: 2018-05-24

## 2018-05-24 DIAGNOSIS — R00.2 PALPITATIONS: ICD-10-CM

## 2018-05-24 DIAGNOSIS — I63.9 CEREBROVASCULAR ACCIDENT (CVA), UNSPECIFIED MECHANISM (HCC): ICD-10-CM

## 2018-05-24 DIAGNOSIS — Z79.01 LONG TERM CURRENT USE OF ANTICOAGULANT THERAPY: ICD-10-CM

## 2018-05-24 NOTE — PROGRESS NOTES
Verbal order and read back per Rossy Torres NP  Patient is within therapeutic range  Continue Coumadin 2 mg daily except 1 mg on Mon   Recheck in 2 weeks  This has been fully explained to the patient, who indicates understanding.

## 2018-06-05 LAB — INR, EXTERNAL: 2.7

## 2018-06-06 ENCOUNTER — TELEPHONE ANTICOAG (OUTPATIENT)
Dept: CARDIOLOGY CLINIC | Age: 73
End: 2018-06-06

## 2018-06-06 DIAGNOSIS — Z79.01 LONG TERM CURRENT USE OF ANTICOAGULANT THERAPY: ICD-10-CM

## 2018-06-06 NOTE — PROGRESS NOTES
Verbal order and read back per Marie iSms NP  The INR is stable and therapeutic.  Continue same dose of coumadin and recheck in 2 weeks

## 2018-06-07 ENCOUNTER — TELEPHONE (OUTPATIENT)
Dept: CARDIOLOGY CLINIC | Age: 73
End: 2018-06-07

## 2018-06-07 NOTE — TELEPHONE ENCOUNTER
She just wore a 48 hour Holter monitor as she has been having more episodes and when they are bothersome, she has taken an extra 1/2 dose of Toprol XL (12.5mg). Her average heart rate on the Holter was 59 bpm and her lowest rate was 38 bpm.      I discussed results with her. I told her that with her heart rate being slightly bradycardic, she should not take extra doses of her beta blocker.       Results of her Holter forwarded to Dr. Zohra Schreiber

## 2018-06-16 ENCOUNTER — TELEPHONE (OUTPATIENT)
Dept: CARDIOLOGY CLINIC | Age: 73
End: 2018-06-16

## 2018-06-16 NOTE — TELEPHONE ENCOUNTER
I called and discussed the results of the patient's Holter study again with her and her palpitation issues. She is having more palpitations recently but she has only had 5 episodes since she was in the hospital in early April so it is just 2 or 3 times a month. However, the episodes last for up to 35 minutes and do bother her. Her average heart rate was 59 on the Holter and although her heart rate dropped down to 38 she is only on Toprol-XL 25 mg a half tablet twice a day and I told her that if she wanted take an extra half a tablet I still thought that that would be okay, but since she has low normal overall left ventricular function with a little apical scar I do not think her rhythm issues are very concerning and unless she has a lot more frequency such as episodes twice a week instead of twice a month I would not do anything else in her workup.  ES

## 2018-06-16 NOTE — TELEPHONE ENCOUNTER
----- Message from Marie Sims NP sent at 6/7/2018 11:14 AM EDT -----  Dr. Albino Gay,    If you have a chance, Mrs. Chip Elliott would like to speak with you regarding her palpitations. She just wore a 48 hour Holter monitor as she has been having more episodes and when they are bothersome, she has taken an extra 1/2 dose of Toprol XL (12.5mg). Her average heart rate on the Holter was 59 bpm and her lowest rate was 38 bpm.    I discussed results with her. I told her that with her heart rate being slightly bradycardic, she should not take extra doses of her beta blocker.      Thanks,  Yesi Forte

## 2018-06-19 ENCOUNTER — TELEPHONE ANTICOAG (OUTPATIENT)
Dept: CARDIOLOGY CLINIC | Age: 73
End: 2018-06-19

## 2018-06-19 DIAGNOSIS — Z79.01 LONG TERM CURRENT USE OF ANTICOAGULANT THERAPY: ICD-10-CM

## 2018-06-19 DIAGNOSIS — I63.9 CEREBROVASCULAR ACCIDENT (CVA), UNSPECIFIED MECHANISM (HCC): ICD-10-CM

## 2018-06-19 LAB
INR, EXTERNAL: 2.3
INR, EXTERNAL: NORMAL

## 2018-06-25 NOTE — PROGRESS NOTES
Verbal order and read back per Magdalena Innocent, NP  Patient is within therapeutic range  Continue Coumadin 2 mg daily except 1 mg on Mon   Recheck 2 weeks

## 2018-09-06 NOTE — LETTER
4/18/2017 12:55 PM 
 
Ms. Donal Rodriguez [id number] Donal Rodriguez was seen in our office on 3/10/2017 for cardiac evaluation. From a cardiac standpoint she moderate risk for her procedure for her hand. She has been given instruction on Lovenox bridging prior to her procedure. Please feel free to contact our office if you have any questions regarding this patient.   
 
 
 
 
 
Sincerely, 
 
 
 
Kev Andrade, DO 

No

## 2018-09-18 ENCOUNTER — HOSPITAL ENCOUNTER (OUTPATIENT)
Dept: MAMMOGRAPHY | Age: 73
Discharge: HOME | End: 2018-09-18
Attending: SPECIALIST
Payer: MEDICARE

## 2018-09-18 DIAGNOSIS — Z12.31 VISIT FOR SCREENING MAMMOGRAM: ICD-10-CM

## 2018-09-18 PROCEDURE — 77067 SCR MAMMO BI INCL CAD: CPT

## 2019-01-18 RX ORDER — WARFARIN 2 MG/1
TABLET ORAL
Qty: 45 TAB | Refills: 0 | Status: SHIPPED | OUTPATIENT
Start: 2019-01-18 | End: 2020-05-10 | Stop reason: CLARIF

## 2019-01-18 NOTE — TELEPHONE ENCOUNTER
Called patient and lm to call office to make a follow up appt with Dr Bishop Ashley as she was last seen by him in April 2018 and did not keep her follow up in Sept 2018. Refill one month supply until appt is made.

## 2019-07-08 RX ORDER — NITROGLYCERIN 0.4 MG/1
0.4 TABLET SUBLINGUAL
Qty: 25 TAB | Refills: 3 | Status: SHIPPED | OUTPATIENT
Start: 2019-07-08

## 2019-08-10 ENCOUNTER — ANESTHESIA EVENT (OUTPATIENT)
Dept: ENDOSCOPY | Age: 74
End: 2019-08-10
Payer: MEDICARE

## 2019-08-12 ENCOUNTER — HOSPITAL ENCOUNTER (OUTPATIENT)
Age: 74
Setting detail: OUTPATIENT SURGERY
Discharge: HOME | End: 2019-08-12
Attending: INTERNAL MEDICINE | Admitting: INTERNAL MEDICINE
Payer: MEDICARE

## 2019-08-12 ENCOUNTER — ANESTHESIA (OUTPATIENT)
Dept: ENDOSCOPY | Age: 74
End: 2019-08-12
Payer: MEDICARE

## 2019-08-12 VITALS
RESPIRATION RATE: 14 BRPM | HEART RATE: 58 BPM | TEMPERATURE: 98.1 F | HEIGHT: 66 IN | OXYGEN SATURATION: 99 % | SYSTOLIC BLOOD PRESSURE: 138 MMHG | WEIGHT: 231 LBS | DIASTOLIC BLOOD PRESSURE: 64 MMHG | BODY MASS INDEX: 37.12 KG/M2

## 2019-08-12 PROCEDURE — 76060000031 HC ANESTHESIA FIRST 0.5 HR: Performed by: INTERNAL MEDICINE

## 2019-08-12 PROCEDURE — 88305 TISSUE EXAM BY PATHOLOGIST: CPT

## 2019-08-12 PROCEDURE — 76040000019: Performed by: INTERNAL MEDICINE

## 2019-08-12 PROCEDURE — 77030021593 HC FCPS BIOP ENDOSC BSC -A: Performed by: INTERNAL MEDICINE

## 2019-08-12 PROCEDURE — 74011250636 HC RX REV CODE- 250/636

## 2019-08-12 PROCEDURE — 74011250636 HC RX REV CODE- 250/636: Performed by: NURSE ANESTHETIST, CERTIFIED REGISTERED

## 2019-08-12 RX ORDER — FAMOTIDINE 10 MG/ML
20 INJECTION INTRAVENOUS ONCE
Status: COMPLETED | OUTPATIENT
Start: 2019-08-12 | End: 2019-08-12

## 2019-08-12 RX ORDER — FLUMAZENIL 0.1 MG/ML
0.2 INJECTION INTRAVENOUS
Status: CANCELLED | OUTPATIENT
Start: 2019-08-12 | End: 2019-08-12

## 2019-08-12 RX ORDER — SODIUM CHLORIDE 0.9 % (FLUSH) 0.9 %
5-40 SYRINGE (ML) INJECTION AS NEEDED
Status: CANCELLED | OUTPATIENT
Start: 2019-08-12

## 2019-08-12 RX ORDER — SODIUM CHLORIDE 0.9 % (FLUSH) 0.9 %
5-40 SYRINGE (ML) INJECTION EVERY 8 HOURS
Status: DISCONTINUED | OUTPATIENT
Start: 2019-08-12 | End: 2019-08-12 | Stop reason: HOSPADM

## 2019-08-12 RX ORDER — SODIUM CHLORIDE 0.9 % (FLUSH) 0.9 %
5-40 SYRINGE (ML) INJECTION EVERY 8 HOURS
Status: CANCELLED | OUTPATIENT
Start: 2019-08-12

## 2019-08-12 RX ORDER — SODIUM CHLORIDE 0.9 % (FLUSH) 0.9 %
5-40 SYRINGE (ML) INJECTION AS NEEDED
Status: DISCONTINUED | OUTPATIENT
Start: 2019-08-12 | End: 2019-08-12 | Stop reason: HOSPADM

## 2019-08-12 RX ORDER — SODIUM CHLORIDE, SODIUM LACTATE, POTASSIUM CHLORIDE, CALCIUM CHLORIDE 600; 310; 30; 20 MG/100ML; MG/100ML; MG/100ML; MG/100ML
INJECTION, SOLUTION INTRAVENOUS
Status: DISCONTINUED | OUTPATIENT
Start: 2019-08-12 | End: 2019-08-12 | Stop reason: HOSPADM

## 2019-08-12 RX ORDER — LIDOCAINE HYDROCHLORIDE 20 MG/ML
INJECTION, SOLUTION EPIDURAL; INFILTRATION; INTRACAUDAL; PERINEURAL AS NEEDED
Status: DISCONTINUED | OUTPATIENT
Start: 2019-08-12 | End: 2019-08-12 | Stop reason: HOSPADM

## 2019-08-12 RX ORDER — LIDOCAINE HYDROCHLORIDE 10 MG/ML
0.1 INJECTION, SOLUTION EPIDURAL; INFILTRATION; INTRACAUDAL; PERINEURAL AS NEEDED
Status: DISCONTINUED | OUTPATIENT
Start: 2019-08-12 | End: 2019-08-12 | Stop reason: HOSPADM

## 2019-08-12 RX ORDER — DEXTROMETHORPHAN/PSEUDOEPHED 2.5-7.5/.8
1.2 DROPS ORAL
Status: CANCELLED | OUTPATIENT
Start: 2019-08-12

## 2019-08-12 RX ORDER — ATROPINE SULFATE 0.1 MG/ML
0.5 INJECTION INTRAVENOUS
Status: CANCELLED | OUTPATIENT
Start: 2019-08-12 | End: 2019-08-13

## 2019-08-12 RX ORDER — ENOXAPARIN SODIUM 100 MG/ML
40 INJECTION SUBCUTANEOUS
COMMUNITY
End: 2020-05-10

## 2019-08-12 RX ORDER — NALOXONE HYDROCHLORIDE 0.4 MG/ML
0.4 INJECTION, SOLUTION INTRAMUSCULAR; INTRAVENOUS; SUBCUTANEOUS
Status: CANCELLED | OUTPATIENT
Start: 2019-08-12 | End: 2019-08-12

## 2019-08-12 RX ORDER — SODIUM CHLORIDE, SODIUM LACTATE, POTASSIUM CHLORIDE, CALCIUM CHLORIDE 600; 310; 30; 20 MG/100ML; MG/100ML; MG/100ML; MG/100ML
75 INJECTION, SOLUTION INTRAVENOUS CONTINUOUS
Status: DISCONTINUED | OUTPATIENT
Start: 2019-08-12 | End: 2019-08-12 | Stop reason: HOSPADM

## 2019-08-12 RX ORDER — EPINEPHRINE 0.1 MG/ML
1 INJECTION INTRACARDIAC; INTRAVENOUS
Status: CANCELLED | OUTPATIENT
Start: 2019-08-12 | End: 2019-08-13

## 2019-08-12 RX ORDER — PROPOFOL 10 MG/ML
INJECTION, EMULSION INTRAVENOUS AS NEEDED
Status: DISCONTINUED | OUTPATIENT
Start: 2019-08-12 | End: 2019-08-12 | Stop reason: HOSPADM

## 2019-08-12 RX ADMIN — LIDOCAINE HYDROCHLORIDE 40 MG: 20 INJECTION, SOLUTION EPIDURAL; INFILTRATION; INTRACAUDAL; PERINEURAL at 07:47

## 2019-08-12 RX ADMIN — PROPOFOL 50 MG: 10 INJECTION, EMULSION INTRAVENOUS at 07:46

## 2019-08-12 RX ADMIN — PROPOFOL 30 MG: 10 INJECTION, EMULSION INTRAVENOUS at 07:51

## 2019-08-12 RX ADMIN — SODIUM CHLORIDE, SODIUM LACTATE, POTASSIUM CHLORIDE, CALCIUM CHLORIDE: 600; 310; 30; 20 INJECTION, SOLUTION INTRAVENOUS at 07:41

## 2019-08-12 RX ADMIN — FAMOTIDINE 20 MG: 10 INJECTION INTRAVENOUS at 07:21

## 2019-08-12 RX ADMIN — LIDOCAINE HYDROCHLORIDE 60 MG: 20 INJECTION, SOLUTION EPIDURAL; INFILTRATION; INTRACAUDAL; PERINEURAL at 07:44

## 2019-08-12 RX ADMIN — PROPOFOL 50 MG: 10 INJECTION, EMULSION INTRAVENOUS at 07:55

## 2019-08-12 RX ADMIN — SODIUM CHLORIDE, SODIUM LACTATE, POTASSIUM CHLORIDE, AND CALCIUM CHLORIDE 75 ML/HR: 600; 310; 30; 20 INJECTION, SOLUTION INTRAVENOUS at 07:21

## 2019-08-12 NOTE — H&P
[facility]  [phone]    GASTROENTEROLOGY Pre-Procedure H and P      Impression/Plan:   1. This patient is consented for a colonoscopy for colon cancer screening. Chief Complaint: colon cancer screening      HPI:  Destiny Jensen is a 69 y.o. female who presents for a colonoscopy for colon cancer screening. PMH:   Past Medical History:   Diagnosis Date    Abnormal myocardial perfusion study 07/07/2014    Mid to distal inferolateral & apical fixed defect c/w prior infarction. No ongoing ischemia. EF 62%. Distal inferolateral & apical akin. Rate-dependent LBBB & occasional PAC & PVCs w/exercise. Ex time 3 min 46 sec.  Arthritis     Broken wrist     fractured elbow also    CAD (coronary artery disease)     Constipation     Crush injury 09/23/2009    fall w/ secondary crush injury to a portion of the elbow w/ subsequent surgery; frozen shoulder     CVA (cerebral vascular accident) ([facility] [address]     hx of 2 small CVAs    Diarrhea     Disc disease, degenerative, cervical     Heart abnormality     Heart disease     atherosclerotic; s/p MIDCAB in 1996 w/ a lt internal mammary artery to the LAD, which was patent and nuclear study in 2007, demonstrating some apical scar w/o ischemia, for medical therapy    History of cardiac cath 06/17/2004    Small RCA - mild. LM - mild. LAD - large aneurysmal segment. Mild LAD and D2. LIMA ok. EF 45-50%. Anterolateral/apical hyk to dysk.  History of echocardiogram 07/17/2015    EF 50-55%. Apical dysk. Mild LVH  Gr 1 DDfx. No significant valvular heart disease.  Hypercholesterolemia     Hypertension     Hypertensive cardiovascular disease     Joint pain     Joint swelling     LBP (low back pain)     Lower extremity venous duplex 07/01/2004    No DVT bilaterally. Patent R SFA w/o pseudoaneurysm.      Muscle ache     Muscle pain     Musculoskeletal chest pain     PVC (premature ventricular contraction)     Reflux     S/P CABG x 1 1996    LIMA-LAD     Sinus problem     Sleep apnea     CPAP    Stiff joint     Stroke [facility])     Stroke (HCC)        PSH:   Past Surgical History:   Procedure Laterality Date    CARDIAC SURG PROCEDURE UNLIST      open heart surgery    HX CORONARY ARTERY BYPASS GRAFT      lt internal mammary artery to lt anterior descending after a failed angioplasty    HX GYN      removal of left ovarian tumor four years ago    HX ORTHOPAEDIC      pinning of fracture in rt forearm    HX OTHER SURGICAL      bilateral TMJ surgery    HX OTHER SURGICAL  10/31/15    vagina biopsy of lesions       Social HX:   Social History     Socioeconomic History    Marital status:      Spouse name: Not on file    Number of children: Not on file    Years of education: Not on file    Highest education level: Not on file   Occupational History    Not on file   Social Needs    Financial resource strain: Not on file    Food insecurity:     Worry: Not on file     Inability: Not on file    Transportation needs:     Medical: Not on file     Non-medical: Not on file   Tobacco Use    Smoking status: Former Smoker     Packs/day: 1.00     Years: 15.00     Pack years: 15.00     Last attempt to quit: 1990     Years since quittin.6    Smokeless tobacco: Never Used   Substance and Sexual Activity    Alcohol use: No    Drug use: No    Sexual activity: Never   Lifestyle    Physical activity:     Days per week: Not on file     Minutes per session: Not on file    Stress: Not on file   Relationships    Social connections:     Talks on phone: Not on file     Gets together: Not on file     Attends [employer] service: Not on file     Active member of club or organization: Not on file     Attends meetings of clubs or organizations: Not on file     Relationship status: Not on file    Intimate partner violence:     Fear of current or ex partner: Not on file     Emotionally abused: Not on file     Physically abused: Not on file     Forced sexual activity: Not on file   Other Topics Concern    Not on file   Social History Narrative    Not on file       FHX:   Family History   Problem Relation Age of Onset    Cancer Mother     Breast Cancer Mother     Cancer Father     Diabetes Brother     Hypertension Brother     Breast Cancer Sister     Breast Cancer Paternal Aunt        Allergy:   Allergies   Allergen Reactions    Codeine Nausea Only     Other reaction(s): gi distress    Crestor [Rosuvastatin] Myalgia    Lipitor [Atorvastatin] Myalgia     High dose only    Morphine Other (comments)    Other Medication Other (comments)     Unable to tolerate any pain medications due to severe GI upset    Penicillins Rash     Other reaction(s): mild rash/itching  And swelling       Home Medications:     Medications Prior to Admission   Medication Sig    enoxaparin (LOVENOX) 40 mg/0.4 mL 40 mg by SubCUTAneous route.  methocarbamol (ROBAXIN) 500 mg tablet Take 1 Tab by mouth four (4) times daily. As needed for pain or muscle spasm    warfarin (COUMADIN) 2 mg tablet TAKE 1 AND 1/2 TABLETS BY MOUTH ONCE DAILY OR AS DIRECTED BY PHYSICIAN.  metoprolol succinate (TOPROL XL) 25 mg XL tablet Take 0.5 Tabs by mouth two (2) times a day. (Patient taking differently: Take 12.5 mg by mouth two (2) times a day. Patient can take additional 1/2 tablet if HR >100)    lisinopril (PRINIVIL, ZESTRIL) 10 mg tablet Take 1 Tab by mouth daily.  cyanocobalamin (VITAMIN B-12) 1,000 mcg tablet Take 1,000 mcg by mouth daily.  acetaminophen (TYLENOL) 325 mg tablet Take 1,000 mg by mouth as needed for Pain.  traMADol (ULTRAM) 50 mg tablet Take 50 mg by mouth as needed for Pain.  atorvastatin (LIPITOR) 40 mg tablet Take 40 mg by mouth daily.  Cholecalciferol, Vitamin D3, (VITAMIN D) 2,000 unit Cap Take 1 Tab by mouth daily.  omeprazole (PRILOSEC) 20 mg capsule Take 20 mg by mouth daily.     ASCORBATE CALCIUM (VITAMIN C PO) Take 1 Tab by mouth Three (3) times a week.  nitroglycerin (NITROSTAT) 0.4 mg SL tablet 1 Tab by SubLINGual route every five (5) minutes as needed for Chest Pain. Review of Systems:     A complete 10 point review of systems was performed and pertinents are as per the HPI. Remainder of the review of systems was negative. Visit Vitals  /66   Pulse 68   Temp 98.1 °F (36.7 °C)   Resp 16   Ht 5' 6\" (1.676 m)   Wt 104.8 kg (231 lb)   SpO2 100%   Breastfeeding? No   BMI 37.28 kg/m²       Physical Assessment:     General: alert, cooperative, no acute distress, appears stated age. HEENT: normocephalic, no scleral icterus, moist mucous membranes, EOMs intact, no neck masses noted. Respiratory: lungs clear to auscultation bilaterally. Cardiovascular: regular rate and rhythm, no murmurs, rubs or gallops. Abdomen: normal bowel sounds, soft, non-tender to palpation. Extremities: no lower extremity edema, no cyanosis or clubbing. Neuro: alert and oriented x 3; non-focal exam.  Skin: no rashes. Psych: normal mood and affect. Rashmi Armendariz MD  [facility]  [email or web address]  Phone: [phone]  Pager: [phone]  [email or web address]

## 2019-08-12 NOTE — PROCEDURES
[email or web address] [address]  [place], [address]      Brief Procedure Note    [place] Bowers  1945  [id number]    Date of Procedure: 8/12/2019    Preoperative diagnosis: Colon Cancer Screening: V76.51 - Z12.11    Postoperative diagnosis: Diverticulosis, Ascending Colon Polyps x 2, Hemorrhoids    Type of Anesthesia: MAC (Monitored anesthesia care)    Description of findings: same as post op dx    Procedure: Procedure(s):  COLONOSCOPY with Polypectomies    :  Dr. Saleem Nieves MD    Assistant(s): Endoscopy RN-1: Veronica Gonzalez RN; Melvi Betancourt RN    EBL:None    Specimens:   ID Type Source Tests Collected by Time Destination   1 : Ascending Polyps Preservative Colon, Ascending  Monica Corona MD 8/12/2019 0755 Pathology       Findings: See printed and scanned procedure note    Complications: None    Dr. Saleem Nieves MD  8/12/2019  8:10 AM

## 2019-08-12 NOTE — ANESTHESIA POSTPROCEDURE EVALUATION
Procedure(s):  COLONOSCOPY with Polypectomies. general    Anesthesia Post Evaluation      Multimodal analgesia: multimodal analgesia not used between 6 hours prior to anesthesia start to PACU discharge  Patient location during evaluation: PACU  Patient participation: complete - patient participated  Level of consciousness: awake and alert  Pain management: adequate  Airway patency: patent  Anesthetic complications: no  Cardiovascular status: acceptable and hemodynamically stable  Respiratory status: acceptable and room air  Hydration status: acceptable  Post anesthesia nausea and vomiting:  none      Vitals Value Taken Time   /64 8/12/2019  8:30 AM   Temp     Pulse 54 8/12/2019  8:33 AM   Resp 16 8/12/2019  8:33 AM   SpO2 100 % 8/12/2019  8:33 AM   Vitals shown include unvalidated device data.

## 2019-08-12 NOTE — ANESTHESIA PREPROCEDURE EVALUATION
Relevant Problems   No relevant active problems       Anesthetic History   No history of anesthetic complications            Review of Systems / Medical History  Patient summary reviewed, nursing notes reviewed and pertinent labs reviewed    Pulmonary        Sleep apnea: CPAP           Neuro/Psych       CVA (right weakness)       Cardiovascular    Hypertension      CHF (ICMO)  Dysrhythmias : PVC  CAD, CABG and hyperlipidemia    Exercise tolerance: >4 METS  Comments: 03/2019 stress  1. Noted fixed perfusion defect involving the apex consistent with prior  infarction. 2. No evidence of reversible myocardial ischemia.   Ejection fraction calculated at 46 %.    03/2019 EKG  Sinus rhythm with 1st degree AV block   Left bundle branch block   GI/Hepatic/Renal                Endo/Other        Obesity, blood dyscrasia (on anticoagulation) and arthritis     Other Findings            Physical Exam    Airway  Mallampati: III  TM Distance: 4 - 6 cm  Neck ROM: decreased range of motion        Cardiovascular    Rhythm: regular           Dental  No notable dental hx       Pulmonary                 Abdominal         Other Findings            Anesthetic Plan    ASA: 3  Anesthesia type: general and MAC          Induction: Intravenous  Anesthetic plan and risks discussed with: Patient

## 2019-08-12 NOTE — DISCHARGE INSTRUCTIONS
Colonoscopy: What to Expect at [address] [place] [address]  After you have a colonoscopy, you will stay at the clinic for 1 to 2 hours until the medicines wear off. Then you can go home. But you will need to arrange for a ride. Your doctor will tell you when you can eat and do your other usual activities. Your doctor will talk to you about when you will need your next colonoscopy. Your doctor can help you decide how often you need to be checked. This will depend on the results of your test and your risk for colorectal cancer. After the test, you may be bloated or have gas pains. You may need to pass gas. If a biopsy was done or a polyp was removed, you may have streaks of blood in your stool (feces) for a few days. This care sheet gives you a general idea about how long it will take for you to recover. But each person recovers at a different pace. Follow the steps below to get better as quickly as possible. How can you care for yourself at home? Activity  · Rest when you feel tired. · You can do your normal activities when it feels okay to do so. Diet  · Follow your doctor's directions for eating. · Unless your doctor has told you not to, drink plenty of fluids. This helps to replace the fluids that were lost during the colon prep. · Do not drink alcohol. Medicines  · If polyps were removed or a biopsy was done during the test, your doctor may tell you not to take aspirin or other anti-inflammatory medicines for a few days. These include ibuprofen (Advil, Motrin) and naproxen (Aleve). Other instructions  · For your safety, do not drive or operate machinery until the medicine wears off and you can think clearly. Your doctor may tell you not to drive or operate machinery until the day after your test.  · Do not sign legal documents or make major decisions until the medicine wears off and you can think clearly. The anesthesia can make it hard for you to fully understand what you are agreeing to.   Follow-up care is a key part of your treatment and safety. Be sure to make and go to all appointments, and call your doctor if you are having problems. It's also a good idea to know your test results and keep a list of the medicines you take. When should you call for help? Call 911 anytime you think you may need emergency care. For example, call if:  · You passed out (lost consciousness). · You pass maroon or bloody stools. · You have severe belly pain. Call your doctor now or seek immediate medical care if:  · Your stools are black and tarlike. · Your stools have streaks of blood, but you did not have a biopsy or any polyps removed. · You have belly pain, or your belly is swollen and firm. · You vomit. · You have a fever. · You are very dizzy. Watch closely for changes in your health, and be sure to contact your doctor if you have any problems. Where can you learn more? Go to DNAdigest.be  Enter E264 in the search box to learn more about \"Colonoscopy: What to Expect at Home. \"   © 0855-7962 Healthwise, Incorporated. Care instructions adapted under license by [facility] (which disclaims liability or warranty for this information). This care instruction is for use with your licensed healthcare professional. If you have questions about a medical condition or this instruction, always ask your healthcare professional. [place] [address] any warranty or liability for your use of this information. Content Version: 03.2.803683; Current as of: November 14, 2014      DISCHARGE SUMMARY from Nurse     POST-PROCEDURE INSTRUCTIONS:    Call your Physician if you:  ? Observe any excess bleeding. ? Develop a temperature over 100.5o F.  ? Experience abdominal, shoulder or chest pain. ? Notice any signs of decreased circulation or nerve impairment to an extremity such as a change in color, persistent numbness, tingling, coldness or increase in pain. ?  Vomit blood or you have nausea and vomiting lasting longer than 4 hours. ? Are unable to take medications. ? Are unable to urinate within 8 hours after discharge following general anesthesia or intravenous sedation. For the next 24 hours after receiving general anesthesia or intravenous sedation, or while taking prescription Narcotics, limit your activities:  ? Do NOT drive a motor vehicle, operate hazard machinery or power tools, or perform tasks that require coordination. The medication you received during your procedure may have some effect on your mental awareness. ? Do NOT make important personal or business decisions. The medication you received during your procedure may have some effect on your mental awareness. ? Do NOT drink alcoholic beverages. These drinks do not mix well with the medications that have been given to you. ? Upon discharge from the hospital, you must be accompanied by a responsible adult. ? Resume your diet as directed by your physician. ? Resume medications as your physician has prescribed. ? Please give a list of your current medications to your Primary Care Provider. ? Please update this list whenever your medications are discontinued, doses are changed, or new medications (including over-the-counter products) are added. ? Please carry medication information at all times in case of emergency situations. These are general instructions for a healthy lifestyle:    No smoking/ No tobacco products/ Avoid exposure to second hand smoke.  Surgeon General's Warning:  Quitting smoking now greatly reduces serious risk to your health. Obesity, smoking, and a sedentary lifestyle greatly increase your risk for illness.    A healthy diet, regular physical exercise & weight monitoring are important for maintaining a healthy lifestyle   You may be retaining fluid if you have a history of heart failure or if you experience any of the following symptoms:  Weight gain of 3 pounds or more overnight or 5 pounds in a week, increased swelling in our hands or feet or shortness of breath while lying flat in bed. Please call your doctor as soon as you notice any of these symptoms; do not wait until your next office visit. Recognize signs and symptoms of STROKE:  F  -  Face looks uneven  A  -  Arms unable to move or move unevenly  S  -  Speech slurred or non-existent  T  -  Time to call 911 - as soon as signs and symptoms begin - DO NOT go back to bed or wait to see If you get better - TIME IS BRAIN. Colorectal Screening   Colorectal cancer almost always develops from precancerous polyps (abnormal growths) in the colon or rectum. Screening tests can find precancerous polyps, so that they can be removed before they turn into cancer. Screening tests can also find colorectal cancer early, when treatment works best.  [facility] Speak with your physician about when you should begin screening and how often you should be tested. Additional Information    If you have questions, please call [phone]. Remember, [employer] is NOT to be used for urgent needs. For medical emergencies, dial 911. Educational references and/or instructions provided during this visit included:    see attachments      APPOINTMENTS:    Please make a follow-up appointment with your physician. Discharge information has been reviewed with the patient and responsible party. The patient and responsible party verbalized understanding. Patient Education   Patient Education   Patient Education        Hemorrhoids: Care Instructions  Your Care Instructions    Hemorrhoids are enlarged veins that develop in the anal canal. Bleeding during bowel movements, itching, swelling, and rectal pain are the most common symptoms. They can be uncomfortable at times, but hemorrhoids rarely are a serious problem. You can treat most hemorrhoids with simple changes to your diet and bowel habits. These changes include eating more fiber and not straining to pass stools.  Most hemorrhoids do not need surgery or other treatment unless they are very large and painful or bleed a lot. Follow-up care is a key part of your treatment and safety. Be sure to make and go to all appointments, and call your doctor if you are having problems. It's also a good idea to know your test results and keep a list of the medicines you take. How can you care for yourself at home? · Sit in a few inches of warm water (sitz bath) 3 times a day and after bowel movements. The warm water helps with pain and itching. · Put ice on your anal area several times a day for 10 minutes at a time. Put a thin cloth between the ice and your skin. Follow this by placing a warm, wet towel on the area for another 10 to 20 minutes. · Take pain medicines exactly as directed. ? If the doctor gave you a prescription medicine for pain, take it as prescribed. ? If you are not taking a prescription pain medicine, ask your doctor if you can take an over-the-counter medicine. · Keep the anal area clean, but be gentle. Use water and a fragrance-free soap, such as [place], or use baby wipes or medicated pads, such as Tucks. · Wear cotton underwear and loose clothing to decrease moisture in the anal area. · Eat more fiber. Include foods such as whole-grain breads and cereals, raw vegetables, raw and dried fruits, and beans. · Drink plenty of fluids, enough so that your urine is light yellow or clear like water. If you have kidney, heart, or liver disease and have to limit fluids, talk with your doctor before you increase the amount of fluids you drink. · Use a stool softener that contains bran or psyllium. You can save money by buying bran or psyllium (available in bulk at most health food stores) and sprinkling it on foods or stirring it into fruit juice. Or you can use a product such as Metamucil or Hydrocil. · Practice healthy bowel habits. ? Go to the bathroom as soon as you have the urge. ? Avoid straining to pass stools. Relax and give yourself time to let things happen naturally. ? Do not hold your breath while passing stools. ? Do not read while sitting on the toilet. Get off the toilet as soon as you have finished. · Take your medicines exactly as prescribed. Call your doctor if you think you are having a problem with your medicine. When should you call for help? Call 911 anytime you think you may need emergency care. For example, call if:    · You pass maroon or very bloody stools.    Call your doctor now or seek immediate medical care if:    · You have increased pain.     · You have increased bleeding.    Watch closely for changes in your health, and be sure to contact your doctor if:    · Your symptoms have not improved after 3 or 4 days. Where can you learn more? Go to http://eufemia-maykel.info/. Enter F228 in the search box to learn more about \"Hemorrhoids: Care Instructions. \"  Current as of: November 7, 2018  Content Version: 12.1  © 8469-7400 [employer]. Care instructions adapted under license by [employer] (which disclaims liability or warranty for this information). If you have questions about a medical condition or this instruction, always ask your healthcare professional. [place] [address] any warranty or liability for your use of this information. Diverticulosis: Care Instructions  Your Care Instructions  In diverticulosis, pouches called diverticula form in the wall of the large intestine (colon). The pouches do not cause any pain or other symptoms. Most people who have diverticulosis do not know they have it. But the pouches sometimes bleed, and if they become infected, they can cause pain and other symptoms. When this happens, it is called diverticulitis. Diverticula form when pressure pushes the wall of the colon outward at certain weak points. A diet that is too low in fiber can cause diverticula.   Follow-up care is a key part of your treatment and safety. Be sure to make and go to all appointments, and call your doctor if you are having problems. It's also a good idea to know your test results and keep a list of the medicines you take. How can you care for yourself at home? · Include fruits, leafy green vegetables, beans, and whole grains in your diet each day. These foods are high in fiber. · Take a fiber supplement, such as Citrucel or Metamucil, every day if needed. Read and follow all instructions on the label. · Drink plenty of fluids, enough so that your urine is light yellow or clear like water. If you have kidney, heart, or liver disease and have to limit fluids, talk with your doctor before you increase the amount of fluids you drink. · Get at least 30 minutes of exercise on most days of the week. Walking is a good choice. You also may want to do other activities, such as running, swimming, cycling, or playing tennis or team sports. · Cut out foods that cause gas, pain, or other symptoms. When should you call for help? Call your doctor now or seek immediate medical care if:    · You have belly pain.     · You pass maroon or very bloody stools.     · You have a fever.     · You have nausea and vomiting.     · You have unusual changes in your bowel movements or abdominal swelling.     · You have burning pain when you urinate.     · You have abnormal vaginal discharge.     · You have shoulder pain.     · You have cramping pain that does not get better when you have a bowel movement or pass gas.     · You pass gas or stool from your urethra while urinating.    Watch closely for changes in your health, and be sure to contact your doctor if you have any problems. Where can you learn more? Go to http://eufemia-maykel.info/. Enter S610 in the search box to learn more about \"Diverticulosis: Care Instructions. \"  Current as of: November 7, 2018  Content Version: 12.1  © 4535-6032 Healthwise, [facility].  Care instructions adapted under license by [employer] (which disclaims liability or warranty for this information). If you have questions about a medical condition or this instruction, always ask your healthcare professional. [address] any warranty or liability for your use of this information. Colon Polyps: Care Instructions  Your Care Instructions    Colon polyps are growths in the colon or the rectum. The cause of most colon polyps is not known, and most people who get them do not have any problems. But a certain kind can turn into cancer. For this reason, regular testing for colon polyps is important for people as they get older. It is also important for anyone who has an increased risk for colon cancer. Polyps are usually found through routine colon cancer screening tests. Although most colon polyps are not cancerous, they are usually removed and then tested for cancer. Screening for colon cancer saves lives because the cancer can usually be cured if it is caught early. If you have a polyp that is the type that can turn into cancer, you may need more tests to examine your entire colon. The doctor will remove any other polyps that he or she finds, and you will be tested more often. Follow-up care is a key part of your treatment and safety. Be sure to make and go to all appointments, and call your doctor if you are having problems. It's also a good idea to know your test results and keep a list of the medicines you take. How can you care for yourself at home? Regular exams to look for colon polyps are the best way to prevent polyps from turning into colon cancer. These can include stool tests, sigmoidoscopy, colonoscopy, and CT colonography. Talk with your doctor about a testing schedule that is right for you. To prevent polyps  There is no home treatment that can prevent colon polyps. But these steps may help lower your risk for cancer. · Stay active.  Being active can help you get to and stay at a healthy weight. Try to exercise on most days of the week. Walking is a good choice. · Eat well. Choose a variety of vegetables, fruits, legumes (such as peas and beans), fish, poultry, and whole grains. · Do not smoke. If you need help quitting, talk to your doctor about stop-smoking programs and medicines. These can increase your chances of quitting for good. · If you drink alcohol, limit how much you drink. Limit alcohol to 2 drinks a day for men and 1 drink a day for women. When should you call for help? Call your doctor now or seek immediate medical care if:    · You have severe belly pain.     · Your stools are maroon or very bloody.    Watch closely for changes in your health, and be sure to contact your doctor if:    · You have a fever.     · You have nausea or vomiting.     · You have a change in bowel habits (new constipation or diarrhea).     · Your symptoms get worse or are not improving as expected. Where can you learn more? Go to http://eufemia-maykel.info/. Enter 95 [id number] in the search box to learn more about \"Colon Polyps: Care Instructions. \"  Current as of: December 19, 2018  Content Version: 12.1  © 0292-2818 Healthwise, Incorporated. Care instructions adapted under license by [employer] (which disclaims liability or warranty for this information). If you have questions about a medical condition or this instruction, always ask your healthcare professional. [address] any warranty or liability for your use of this information.

## 2020-02-28 NOTE — PROGRESS NOTES
Ms. Kaylie Drew has a reminder for a \"due or due soon\" health maintenance. I have asked that she contact her primary care provider for follow-up on this health maintenance. Subjective     Mariana Olguin is a 31 year old female who presents to clinic today for the following health issues:    HPI   URINARY TRACT SYMPTOMS      Duration: x 6 days    Description  dysuria, frequency and urgency, pelvic pressure    Intensity:  moderate    Accompanying signs and symptoms:    Fever/chills: no- but feels flushed  Flank pain no   Nausea and vomiting: no   Vaginal symptoms: none  Abdominal/Pelvic Pain: pelvic pressure and aching    History  History of frequent UTI's: no   History of kidney stones: no   Sexually Active: YES with one partner for 10 months, was tested for STDs 2 months prior to initiating relationship  Possibility of pregnancy: no, very doubtful, IUD and had period last month    Precipitating or alleviating factors: None    Therapies tried and outcome: cranberry juice  and increase fluid intake   Outcome: Sx's still present.     There is no problem list on file for this patient.    No past surgical history on file.    Social History     Tobacco Use     Smoking status: Never Smoker     Smokeless tobacco: Never Used   Substance Use Topics     Alcohol use: Yes     No family history on file.      Current Outpatient Medications   Medication Sig Dispense Refill     buPROPion (WELLBUTRIN XL) 300 MG 24 hr tablet TK 1 T PO QD       lamoTRIgine (LAMICTAL) 150 MG tablet TK 1 T PO D       metroNIDAZOLE (FLAGYL) 500 MG tablet Take 1 tablet (500 mg) by mouth 2 times daily for 7 days 14 tablet 0     metroNIDAZOLE (METROGEL) 0.75 % vaginal gel Insert one applicatorful nightly for 7 nights 30 g 0     Allergies   Allergen Reactions     Amoxicillin      Childhood allergy       Reviewed and updated as needed this visit by Provider       Review of Systems   ROS COMP: Constitutional, HEENT, cardiovascular, pulmonary, gi and gu systems are negative, except as otherwise noted.      Objective    /81 (BP Location: Left arm, Patient Position: Sitting, Cuff Size: Adult Regular)   Pulse 86   Temp 97.5  " F (36.4  C) (Oral)   Ht 1.689 m (5' 6.5\")   Wt 59 kg (130 lb)   LMP 01/15/2020 (Approximate)   SpO2 99%   Breastfeeding No   BMI 20.67 kg/m    Body mass index is 20.67 kg/m .  Physical Exam   GENERAL: healthy, alert and no distress  ABDOMEN: soft, mild suprapubic tenderness,  (female): female external genitalia- large left non tender non inflamed Barthlin cyst, normal urethral meatus, vaginal mucosa, normal cervix/adnexa/uterus without masses or discharge  BACK: no CVA tenderness  , Diagnostic Test Results:  Results for orders placed or performed in visit on 02/28/20 (from the past 24 hour(s))   UA with Microscopic reflex to Culture   Result Value Ref Range    Color Urine Yellow     Appearance Urine Clear     Glucose Urine Negative NEG^Negative mg/dL    Bilirubin Urine Negative NEG^Negative    Ketones Urine Negative NEG^Negative mg/dL    Specific Gravity Urine >1.030 1.003 - 1.035    pH Urine 5.5 5.0 - 7.0 pH    Protein Albumin Urine Negative NEG^Negative mg/dL    Urobilinogen Urine 0.2 0.2 - 1.0 EU/dL    Nitrite Urine Negative NEG^Negative    Blood Urine Negative NEG^Negative    Leukocyte Esterase Urine Negative NEG^Negative    Source Midstream Urine     WBC Urine PENDING OTO5^0 - 5 /HPF    RBC Urine PENDING OTO2^O - 2 /HPF   Wet prep   Result Value Ref Range    Specimen Description Vagina     Wet Prep No Trichomonas seen     Wet Prep No yeast seen     Wet Prep Rare  Clue cells seen   (A)     Wet Prep No WBC's seen            Assessment & Plan       ICD-10-CM    1. Dysuria R30.0 UA with Microscopic reflex to Culture     Wet prep   2. Increased frequency of urination R35.0    3. BV (bacterial vaginosis) N76.0 metroNIDAZOLE (METROGEL) 0.75 % vaginal gel    B96.89 metroNIDAZOLE (FLAGYL) 500 MG tablet   she has no insurance coverage currently and so will shop around with the 2 prescriptions to determine best price  Cautioned about alcohol consumption with oral formula       GOSIA [facility]  [place] " [facility]

## 2020-05-10 PROBLEM — I25.10 CORONARY ARTERIOSCLEROSIS: Status: ACTIVE | Noted: 2020-05-10

## 2021-01-04 ENCOUNTER — HOSPITAL ENCOUNTER (OUTPATIENT)
Dept: GENERAL RADIOLOGY | Age: 76
Discharge: HOME | End: 2021-01-04
Payer: MEDICARE

## 2021-01-04 DIAGNOSIS — M54.50 LOW BACK PAIN: ICD-10-CM

## 2021-01-04 PROCEDURE — 72110 X-RAY EXAM L-2 SPINE 4/>VWS: CPT

## 2021-02-02 ENCOUNTER — HOSPITAL ENCOUNTER (OUTPATIENT)
Dept: PHYSICAL THERAPY | Age: 76
Discharge: HOME | End: 2021-02-02
Payer: MEDICARE

## 2021-02-02 PROCEDURE — 97162 PT EVAL MOD COMPLEX 30 MIN: CPT

## 2021-02-02 PROCEDURE — 97110 THERAPEUTIC EXERCISES: CPT

## 2021-02-02 PROCEDURE — 97140 MANUAL THERAPY 1/> REGIONS: CPT

## 2021-02-02 NOTE — PROGRESS NOTES
PT DAILY TREATMENT NOTE     Patient Name: Darren Koehler  Date:2021  : 1945  [x]  Patient  Verified  Payor: [place] MEDICARE / Plan: [place] MEDICARE PART A & B / Product Type: Medicare /    In time:244  Out time:330  Total Treatment Time (min): 46  Visit #: 1 of 10    Medicare/BCBS Only   Total Timed Codes (min):  24 1:1 Treatment Time:  46       Treatment Area: Low back pain [M54.5]    SUBJECTIVE  Pain Level (0-10 scale): 5/10  Any medication changes, allergies to medications, adverse drug reactions, diagnosis change, or new procedure performed?: [x] No    [] Yes (see summary sheet for update)  Subjective functional status/changes:   [] No changes reported      OBJECTIVE    Modality rationale:    Min Type Additional Details    [] Estim:  []Unatt       []IFC  []Premod                        []Other:  []w/ice   []w/heat  Position:  Location:    [] Estim: []Att    []TENS instruct  []NMES                    []Other:  []w/US   []w/ice   []w/heat  Position:  Location:    []  Traction: [] Cervical       []Lumbar                       [] Prone          []Supine                       []Intermittent   []Continuous Lbs:  [] before manual  [] after manual    []  Ultrasound: []Continuous   [] Pulsed                           []1MHz   []3MHz W/cm2:  Location:    []  Iontophoresis with dexamethasone         Location: [] Take home patch   [] In clinic    []  Ice     []  heat  []  Ice massage  []  Laser   []  Anodyne Position:  Location:    []  Laser with stim  []  Other:  Position:  Location:    []  Vasopneumatic Device Pressure:       [] lo [] med [] hi   Temperature: [] lo [] med [] hi   [] Skin assessment post-treatment:  []intact []redness- no adverse reaction    []redness  adverse reaction:     22 min [x]Eval                  []Re-Eval       14 min Therapeutic Exercise:  [] See flow sheet :HEP   Rationale: increase ROM and increase strength to improve the patients ability to improve ease of ADLs    10 min Manual Therapy:  Bilateral sidelying STM to gluteals   The manual therapy interventions were performed at a separate and distinct time from the therapeutic activities interventions. Rationale: decrease pain, increase ROM, increase tissue extensibility and decrease trigger points to improve ease of transfers, bed mobility     With   [] TE   [] TA   [] neuro   [] other: Patient Education: [x] Review HEP    [] Progressed/Changed HEP based on:   [] positioning   [] body mechanics   [] transfers   [] heat/ice application    [] other:      Other Objective/Functional Measures: see POC     Pain Level (0-10 scale) post treatment: 5/10    ASSESSMENT/Changes in Function: see POC    Patient will continue to benefit from skilled PT services to modify and progress therapeutic interventions, address functional mobility deficits, address ROM deficits, address strength deficits, analyze and address soft tissue restrictions, analyze and cue movement patterns, analyze and modify body mechanics/ergonomics, assess and modify postural abnormalities, address imbalance/dizziness and instruct in home and community integration to attain remaining goals.      [x]  See Plan of Care  []  See progress note/recertification  []  See Discharge Summary         Progress towards goals / Updated goals:  See POC    PLAN  []  Upgrade activities as tolerated     [x]  Continue plan of care  []  Update interventions per flow sheet       []  Discharge due to:_  []  Other:_      Wilbert Avila PT 2/2/2021  2:37 PM    Future Appointments   Date Time Provider Yoseph Trejo   2/2/2021  2:45 PM Arelis Combs PT [facility] Referred To [facility] Text (Leave Blank If You Do Not Want): After obtaining clear surgical margins the patient was sent to an ASC for surgical repair.  The patient understands they will receive post-surgical care and follow-up from the ASC physician.

## 2021-02-02 NOTE — PROGRESS NOTES
[facility]  [address]  [phone] fax  Plan of Care/ Statement of Necessity for Physical Therapy Services    Patient name: Boby Howe Start of Care: 2021   Referral source: Bel Olivo MD : 1945    Medical Diagnosis: Low back pain [M54.5]  Payor: Lefty Ryder / Plan: [place] MEDICARE PART A & B / Product Type: Medicare /  Onset Date:2020    Treatment Diagnosis: low back, bilateral hip pain   Prior Hospitalization: see medical history Provider#: [id number]   Medications: Verified on Patient summary List    Comorbidities: heart disease (open heart surgery ), arthritis, HTN, stroke  affecting Right side   Prior Level of Function: Functionally independent, lives with  in single level home, walks on treadmill, enjoys working with stained glass      The Plan of Care and following information is based on the information from the initial evaluation. Assessment/ key information: Patient is a pleasant 77year old female who presents with complaints of chronic low back pain due to arthritis, as well as bilateral hip pain that has worsened since December of last year when she was reorganizing her attic. 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 At evaluation Patient demonstrates some deficits with lumbar mobility, as well as poor bilateral hip flexor flexibility and gluteal strength; increased pain and impaired height with supine bridge. Very tender to palpation in bilateral gluteal musculature as well as the lumbar paraspinals. Good overall balance. Overall Patient is a good rehab candidate based on premorbid status, and will benefit from skilled physical therapy in order to address the above deficits.      Evaluation Complexity History HIGH Complexity :3+ comorbidities / personal factors will impact the outcome/ POC ; Examination MEDIUM Complexity : 3 Standardized tests and measures addressing body structure, function, activity limitation and / or participation in recreation  ;Presentation LOW Complexity : Stable, uncomplicated  ;Clinical Decision Making MEDIUM Complexity : FOTO score of 26-74  Overall Complexity Rating: MEDIUM  Problem List: pain affecting function, decrease ROM, decrease strength, edema affecting function, impaired gait/ balance, decrease ADL/ functional abilitiies, decrease activity tolerance, decrease flexibility/ joint mobility and decrease transfer abilities   Treatment Plan may include any combination of the following: Therapeutic exercise, Therapeutic activities, Neuromuscular re-education, Physical agent/modality, Gait/balance training, Manual therapy, Aquatic therapy, Patient education, Self Care training, Functional mobility training, Home safety training and Stair training  Patient / Family readiness to learn indicated by: asking questions, trying to perform skills and interest  Persons(s) to be included in education: patient (P)  Barriers to Learning/Limitations: None  Patient Goal (s): help with pain and more movement  Patient Self Reported Health Status: good  Rehabilitation Potential: good    Short Term Goals: To be accomplished in 1 weeks:  Goal: Patient will be independent and compliant with HEP in order to progress toward long term goals. Status at last note/certification: issued and reviewed  Long Term Goals: To be accomplished in 10 treatments:  Goal: Patient will improve FOTO assessment score to 53 pts in order to indicate improved functional abilities. Status at lats note/certification: 45 pts  Goal: Patient will improve Left hip abduction strength to at least 4+/5 in order to reduce lumbar strain with transfers, standing activities.   Status at last note/certification: 3/5  Goal: Patient will report no increased low back pain with full supine bridge in order to improve ease of bed mobility. Status at last note/certification: increased pain, bridge to 50% height  Goal: Patient will report worst low back, hip pain as 5/10 or less in order to improve overall activity tolerance. Status at last note/certification: 62/62  Goal: Patient will improve Right lumbar lateral flexion AROM to WNL without increased pain in order to improve ease of ADLs. Status at last note/certification: 79% with increased pain     Frequency / Duration: Patient to be seen 2-3 times per week for 10 treatments. Patient/ Caregiver education and instruction: Diagnosis, prognosis, exercises   [x]  Plan of care has been reviewed with PTA    Certification Period: 2/2/21 to 3/3/21  Jean Paul Malhotra, PT 2/2/2021 2:38 PM  _____________________________________________________________________  I certify that the above Therapy Services are being furnished while the patient is under my care. I agree with the treatment plan and certify that this therapy is necessary.     Physician's Signature:____________Date:_________TIME:________    [employer], Date and Time must be completed for valid certification **    Please sign and return to [facility]  [address]  [phone] fax

## 2021-02-04 ENCOUNTER — HOSPITAL ENCOUNTER (OUTPATIENT)
Dept: PHYSICAL THERAPY | Age: 76
Discharge: HOME | End: 2021-02-04
Payer: MEDICARE

## 2021-02-04 PROCEDURE — 97140 MANUAL THERAPY 1/> REGIONS: CPT

## 2021-02-04 PROCEDURE — 97110 THERAPEUTIC EXERCISES: CPT

## 2021-02-04 PROCEDURE — 97014 ELECTRIC STIMULATION THERAPY: CPT

## 2021-02-04 NOTE — PROGRESS NOTES
PT DAILY TREATMENT NOTE     Patient Name: Paris Lagos  MWHU:[id number]  : 1945  [x]  Patient  Verified  Payor: [place] MEDICARE / Plan: [place] MEDICARE PART A & B / Product Type: Medicare /    In time:415  Out time:509  Total Treatment Time (min): 47  Visit #: 2 of 10    Medicare/BCBS Only   Total Timed Codes (min):  44 1:1 Treatment Time:  44       Treatment Area: Low back pain [M54.5]    SUBJECTIVE  Pain Level (0-10 scale): 3/10  Any medication changes, allergies to medications, adverse drug reactions, diagnosis change, or new procedure performed?: [x] No    [] Yes (see summary sheet for update)  Subjective functional status/changes:   [] No changes reported  The back and hips were fussy yesterday.      OBJECTIVE    Modality rationale: decrease pain and increase tissue extensibility to improve the patients ability to reduce soreness following therapy session   Min Type Additional Details   10 [x] Estim:  [x]Unatt       [x]IFC  []Premod                        []Other:  []w/ice   [x]w/heat  Position:reclined with wedge  Location:low back    [] Estim: []Att    []TENS instruct  []NMES                    []Other:  []w/US   []w/ice   []w/heat  Position:  Location:    []  Traction: [] Cervical       []Lumbar                       [] Prone          []Supine                       []Intermittent   []Continuous Lbs:  [] before manual  [] after manual    []  Ultrasound: []Continuous   [] Pulsed                           []1MHz   []3MHz W/cm2:  Location:    []  Iontophoresis with dexamethasone         Location: [] Take home patch   [] In clinic    []  Ice     []  heat  []  Ice massage  []  Laser   []  Anodyne Position:  Location:    []  Laser with stim  []  Other:  Position:  Location:    []  Vasopneumatic Device Pressure:       [] lo [] med [] hi   Temperature: [] lo [] med [] hi   [x] Skin assessment post-treatment:  [x]intact []redness- no adverse reaction    []redness  adverse reaction:     34 min Therapeutic Exercise:  [x] See flow sheet :   Rationale: increase ROM and increase strength to improve the patients ability to improve ease of ADLs,     10 min Manual Therapy:  Bilateral sidelying STM to gluteal/hamstring musculature    The manual therapy interventions were performed at a separate and distinct time from the therapeutic activities interventions. Rationale: decrease pain, increase ROM, increase tissue extensibility and decrease trigger points to improve ease of transfers, ambulation     With   [] TE   [] TA   [] neuro   [] other: Patient Education: [x] Review HEP    [] Progressed/Changed HEP based on:   [] positioning   [] body mechanics   [] transfers   [] heat/ice application    [] other:      Other Objective/Functional Measures: initiated treatment per flow sheet     Pain Level (0-10 scale) post treatment: 2/10    ASSESSMENT/Changes in Function: Patient very tender to palpation in the gluteal and hamstring musculature. Avoided sidelying clams today due to soreness in sidelying, and Patient also reports increased pain (in the buttocks/SIJ) with SLRs. Good tolerance to modalities with decreased pain. We will progress as able. Patient will continue to benefit from skilled PT services to modify and progress therapeutic interventions, address functional mobility deficits, address ROM deficits, address strength deficits, analyze and address soft tissue restrictions, analyze and cue movement patterns, analyze and modify body mechanics/ergonomics, assess and modify postural abnormalities, address imbalance/dizziness and instruct in home and community integration to attain remaining goals. []  See Plan of Care  []  See progress note/recertification  []  See Discharge Summary         Progress towards goals / Updated goals:  Short Term Goals: To be accomplished in 1 weeks:  Goal: Patient will be independent and compliant with HEP in order to progress toward long term goals.   Status at last note/certification: issued and reviewed  Current status:   Long Term Goals: To be accomplished in 10 treatments:  Goal: Patient will improve FOTO assessment score to 53 pts in order to indicate improved functional abilities. Status at lats note/certification: 45 pts  Current status:   Goal: Patient will improve Left hip abduction strength to at least 4+/5 in order to reduce lumbar strain with transfers, standing activities. Status at last note/certification: 3/5  Current status:   Goal: Patient will report no increased low back pain with full supine bridge in order to improve ease of bed mobility. Status at last note/certification: increased pain, bridge to 50% height  Current status:   Goal: Patient will report worst low back, hip pain as 5/10 or less in order to improve overall activity tolerance. Status at last note/certification: 77/76  Current status:   Goal: Patient will improve Right lumbar lateral flexion AROM to WNL without increased pain in order to improve ease of ADLs.   Status at last note/certification: 89% with increased pain  Current status:     PLAN  []  Upgrade activities as tolerated     [x]  Continue plan of care  []  Update interventions per flow sheet       []  Discharge due to:_  []  Other:_      Dominic Alvarado, PT 2/4/2021  4:05 PM    Future Appointments   Date Time Provider Yoseph Trejo   2/4/2021  4:15 PM Harinder Ayers, PT [facility]   2/9/2021  2:45 PM Harinder Ayers, PT [facility]   2/11/2021  5:00 PM Vladimir Piccolo [facility]   2/16/2021  2:45 PM Harinder Ayers, PT [facility]   2/19/2021  2:30 PM Vladimir Piccolo [facility]   2/23/2021  2:45 PM Scarlett Merlos PTA [facility]   2/25/2021  2:45 PM Scarlett Merlos PTA [facility]   3/1/2021  2:15 PM Harinder Ayers, PT [facility]   3/4/2021  2:45 PM Laila Christianson, PTA [facility]

## 2021-02-09 ENCOUNTER — HOSPITAL ENCOUNTER (OUTPATIENT)
Dept: PHYSICAL THERAPY | Age: 76
Discharge: HOME | End: 2021-02-09
Payer: MEDICARE

## 2021-02-09 PROCEDURE — 97140 MANUAL THERAPY 1/> REGIONS: CPT

## 2021-02-09 PROCEDURE — 97110 THERAPEUTIC EXERCISES: CPT

## 2021-02-09 PROCEDURE — 97014 ELECTRIC STIMULATION THERAPY: CPT

## 2021-02-09 NOTE — PROGRESS NOTES
PT DAILY TREATMENT NOTE     Patient Name: Elise San  Date:2021  : 1945  [x]  Patient  Verified  Payor: VA MEDICARE / Plan: VA MEDICARE PART A & B / Product Type: Medicare /    In time:241  Out time:338  Total Treatment Time (min): 62  Visit #: 3 of 10    Medicare/BCBS Only   Total Timed Codes (min):  47 1:1 Treatment Time:  47       Treatment Area: Low back pain [M54.5]    SUBJECTIVE  Pain Level (0-10 scale): 3/10  Any medication changes, allergies to medications, adverse drug reactions, diagnosis change, or new procedure performed?: [x] No    [] Yes (see summary sheet for update)  Subjective functional status/changes:   [] No changes reported  The back feels pretty good today, the hips are just a little sore.      OBJECTIVE    Modality rationale: decrease pain and increase tissue extensibility to improve the patients ability to reduce soreness after exercises    Min Type Additional Details   10 [x] Estim:  [x]Unatt       [x]IFC  []Premod                        []Other:  []w/ice   [x]w/heat  Position:reclined  Location:low back    [] Estim: []Att    []TENS instruct  []NMES                    []Other:  []w/US   []w/ice   []w/heat  Position:  Location:    []  Traction: [] Cervical       []Lumbar                       [] Prone          []Supine                       []Intermittent   []Continuous Lbs:  [] before manual  [] after manual    []  Ultrasound: []Continuous   [] Pulsed                           []1MHz   []3MHz W/cm2:  Location:    []  Iontophoresis with dexamethasone         Location: [] Take home patch   [] In clinic    []  Ice     []  heat  []  Ice massage  []  Laser   []  Anodyne Position:  Location:    []  Laser with stim  []  Other:  Position:  Location:    []  Vasopneumatic Device Pressure:       [] lo [] med [] hi   Temperature: [] lo [] med [] hi   [x] Skin assessment post-treatment:  [x]intact []redness- no adverse reaction    []redness  adverse reaction:     37 min Therapeutic Exercise:  [x] See flow sheet :   Rationale: increase ROM and increase strength to improve the patients ability to improve stand/amb tolerance, ease of transfers    10 min Manual Therapy: Right sidelying STM to Left gluteals, Supine STM to Right gluteals    The manual therapy interventions were performed at a separate and distinct time from the therapeutic activities interventions. Rationale: decrease pain, increase ROM, increase tissue extensibility and decrease trigger points to improve ease of muscle activation with exercises, improve ease of transfers       With   [] TE   [] TA   [] neuro   [] other: Patient Education: [x] Review HEP    [] Progressed/Changed HEP based on:   [] positioning   [] body mechanics   [] transfers   [] heat/ice application    [] other:      Other Objective/Functional Measures: added in squat to target at plinth, side stepping, step ups     Pain Level (0-10 scale) post treatment: 2/10    ASSESSMENT/Changes in Function: Patient reports no adverse reaction to first treatment session, and states that pain levels have been gradually lessening. She is still cleaning out her closet and reports increased challenge with that. At today's session Patient notes Left hip pain, that appears to originate at the greater trochanter, with standing LE activities especially when weight bearing on the Left. Patient will continue to benefit from skilled PT services to modify and progress therapeutic interventions, address functional mobility deficits, address ROM deficits, address strength deficits, analyze and address soft tissue restrictions, analyze and cue movement patterns, analyze and modify body mechanics/ergonomics, assess and modify postural abnormalities, address imbalance/dizziness and instruct in home and community integration to attain remaining goals.      []  See Plan of Care  []  See progress note/recertification  []  See Discharge Summary         Progress towards goals / Updated goals:  Short Term Goals: To be accomplished in 1 weeks:  Goal: Patient will be independent and compliant with HEP in order to progress toward long term goals. Status at last note/certification: issued and reviewed  Current status: met, 2/9/21  Long Term Goals: To be accomplished in 10 treatments:  Goal: Patient will improve FOTO assessment score to 53 pts in order to indicate improved functional abilities. Status at lats note/certification: 48 FYF  Current status: reassess at MD note  Goal: Patient will improve Left hip abduction strength to at least 4+/5 in order to reduce lumbar strain with transfers, standing activities. Status at last note/certification: 3/5  Current status: reassess closer to MD note 2/9/21  Goal: Patient will report no increased low back pain with full supine bridge in order to improve ease of bed mobility. Status at last note/certification: increased pain, bridge to 50% height  Current status: progressing, improving ease of bridging 2/9/21  Goal: Patient will report worst low back, hip pain as 5/10 or less in order to improve overall activity tolerance. Status at last note/certification: 83/93  Current status: progressing, less pain recently 2/9/21  Goal: Patient will improve Right lumbar lateral flexion AROM to WNL without increased pain in order to improve ease of ADLs.   Status at last note/certification: 10% with increased pain  Current status: progressing, no drastic change in ROM but some decrease in pain 2/9/21    PLAN  [x]  Upgrade activities as tolerated     [x]  Continue plan of care  []  Update interventions per flow sheet       []  Discharge due to:_  []  Other:_      Lamar Ybarra, PT 2/9/2021  2:39 PM    Future Appointments   Date Time Provider Yoseph Trejo   2/9/2021  2:45 PM Leonides Veliz, PT [address]   2/11/2021  5:00 PM [address]   2/16/2021  2:45 PM [address]   2/19/2021  2:30 PM Leonides Veliz, PT [facility]   2/23/2021  2:45 PM Cora Velázquez, [facility]   2/25/2021  2:45 PM [facility]   3/1/2021  2:15 PM [facility]   3/4/2021  2:45 PM Yusra Adame [facility]

## 2021-02-11 ENCOUNTER — APPOINTMENT (OUTPATIENT)
Dept: PHYSICAL THERAPY | Age: 76
End: 2021-02-11
Payer: MEDICARE

## 2021-02-16 ENCOUNTER — HOSPITAL ENCOUNTER (OUTPATIENT)
Dept: PHYSICAL THERAPY | Age: 76
Discharge: HOME | End: 2021-02-16
Payer: MEDICARE

## 2021-02-16 PROCEDURE — 97140 MANUAL THERAPY 1/> REGIONS: CPT

## 2021-02-16 PROCEDURE — 97110 THERAPEUTIC EXERCISES: CPT

## 2021-02-16 NOTE — PROGRESS NOTES
PT DAILY TREATMENT NOTE     Patient Name: Aminta Hughes  Date:2021  : 1945  [x]  Patient  Verified  Payor: Vaibhav Boyle / Plan: [place] / Product Type: Medicare /    In time:243  Out time:340  Total Treatment Time (min): 62  Visit #: 4 of 10    Medicare/BCBS Only   Total Timed Codes (min):  57 1:1 Treatment Time:  57       Treatment Area: Low back pain [M54.5]    SUBJECTIVE  Pain Level (0-10 scale): 3/10  Any medication changes, allergies to medications, adverse drug reactions, diagnosis change, or new procedure performed?: [x] No    [] Yes (see summary sheet for update)  Subjective functional status/changes:   [] No changes reported  My hips are achy.      OBJECTIVE    Modality rationale: Patient declined   Min Type Additional Details    [] Estim:  []Unatt       []IFC  []Premod                        []Other:  []w/ice   []w/heat  Position:  Location:    [] Estim: []Att    []TENS instruct  []NMES                    []Other:  []w/US   []w/ice   []w/heat  Position:  Location:    []  Traction: [] Cervical       []Lumbar                       [] Prone          []Supine                       []Intermittent   []Continuous Lbs:  [] before manual  [] after manual    []  Ultrasound: []Continuous   [] Pulsed                           []1MHz   []3MHz W/cm2:  Location:    []  Iontophoresis with dexamethasone         Location: [] Take home patch   [] In clinic    []  Ice     []  heat  []  Ice massage  []  Laser   []  Anodyne Position:  Location:    []  Laser with stim  []  Other:  Position:  Location:    []  Vasopneumatic Device Pressure:       [] lo [] med [] hi   Temperature: [] lo [] med [] hi   [] Skin assessment post-treatment:  []intact []redness- no adverse reaction    []redness  adverse reaction:     47 min Therapeutic Exercise:  [x] See flow sheet :   Rationale: increase ROM and increase strength to improve the patients ability to improve stand/amb tolerance with daily tasks     10 min Manual Therapy:  Right sidelying STM to Left gluteals, Supine STM to Right gluteals    The manual therapy interventions were performed at a separate and distinct time from the therapeutic activities interventions. Rationale: decrease pain, increase ROM, increase tissue extensibility and decrease trigger points to improve ease of bed mobility       With   [] TE   [] TA   [] neuro   [] other: Patient Education: [x] Review HEP    [] Progressed/Changed HEP based on:   [] positioning   [] body mechanics   [] transfers   [] heat/ice application    [] other:      Other Objective/Functional Measures: able to perform sidelying clams bilaterally  Added ankle weights to parallel bars activities    Issued updated HEP; issued information on purchasing her own TENS unit through [employer]    Pain Level (0-10 scale) post treatment: 2/10    ASSESSMENT/Changes in Function: Patient continues to have bilateral hip soreness with daily tasks, but states that it is less tender than it was. Able to lie on her Left side this afternoon to perform Right clams. Patient notes increased Left hip discomfort with Left LE weight bearing during Right LE activities, but overall no increased soreness after session. Patient will look into purchasing her own TENS unit; declined modalities. Patient will continue to benefit from skilled PT services to modify and progress therapeutic interventions, address functional mobility deficits, address ROM deficits, address strength deficits, analyze and address soft tissue restrictions, analyze and cue movement patterns, analyze and modify body mechanics/ergonomics, assess and modify postural abnormalities, address imbalance/dizziness and instruct in home and community integration to attain remaining goals.      []  See Plan of Care  []  See progress note/recertification  []  See Discharge Summary         Progress towards goals / Updated goals:  Short Term Goals: To be accomplished in 1 weeks:  Goal: Patient will be independent and compliant with HEP in order to progress toward long term goals. Status at last note/certification: issued and reviewed  Current status: met, 2/9/21  Long Term Goals: To be accomplished in 10 treatments:  Goal: Patient will improve FOTO assessment score to 53 pts in order to indicate improved functional abilities. Status at lats note/certification: 55 BHX  Current status: reassess at MD note  Goal: Patient will improve Left hip abduction strength to at least 4+/5 in order to reduce lumbar strain with transfers, standing activities. Status at last note/certification: 3/5  Current status: reassess closer to MD note 2/9/21  Goal: Patient will report no increased low back pain with full supine bridge in order to improve ease of bed mobility. Status at last note/certification: increased pain, bridge to 50% height  Current status: progressing, improving ease of bridging 2/9/21  Goal: Patient will report worst low back, hip pain as 5/10 or less in order to improve overall activity tolerance. Status at last note/certification: 35/21  Current status: progressing, less pain recently 2/9/21  Goal: Patient will improve Right lumbar lateral flexion AROM to WNL without increased pain in order to improve ease of ADLs.   Status at last note/certification: 60% with increased pain  Current status: progressing, no drastic change in ROM but some decrease in pain 2/9/21    PLAN  [x]  Upgrade activities as tolerated     [x]  Continue plan of care  []  Update interventions per flow sheet       []  Discharge due to:_  []  Other:_      Juanita Del Angel, PT 2/16/2021  2:39 PM    Future Appointments   Date Time Provider Yoseph Trejo   2/16/2021  2:45 PM Lauren Rosa PT [facility]   2/19/2021  2:30 PM Oswaldo Suggs [facility]   2/23/2021  2:45 PM Wendi Boas, PTA [facility]   2/25/2021  2:45 PM Lauren Rosa PT [facility]   3/1/2021  2:15 PM Lauren Rosa, PT [facility]   3/4/2021 2:45 PM Rafa Flores PTA [facility]

## 2021-02-19 ENCOUNTER — HOSPITAL ENCOUNTER (OUTPATIENT)
Dept: PHYSICAL THERAPY | Age: 76
Discharge: HOME | End: 2021-02-19
Payer: MEDICARE

## 2021-02-19 PROCEDURE — 97140 MANUAL THERAPY 1/> REGIONS: CPT

## 2021-02-19 PROCEDURE — 97110 THERAPEUTIC EXERCISES: CPT

## 2021-02-19 NOTE — PROGRESS NOTES
PT DAILY TREATMENT NOTE     Patient Name: Ayden Espinal  Date:2021  : 1945  [x]  Patient  Verified  Payor: Cici Hill / Plan: [place] MED[place]ARE PART A & B / Product Type: Medicare /    In time:230  Out time:330  Total Treatment Time (min): 60  Visit #: 5 of 10    Medicare/BCBS Only   Total Timed Codes (min):  60 1:1 Treatment Time:  55       Treatment Area: Low back pain [M54.5]    SUBJECTIVE  Pain Level (0-10 scale): 3/10  Any medication changes, allergies to medications, adverse drug reactions, diagnosis change, or new procedure performed?: [x] No    [] Yes (see summary sheet for update)  Subjective functional status/changes:   [] No changes reported  I feel it.      OBJECTIVE    Modality rationale: Patient declined   Min Type Additional Details    [] Estim:  []Unatt       []IFC  []Premod                        []Other:  []w/ice   []w/heat  Position:  Location:    [] Estim: []Att    []TENS instruct  []NMES                    []Other:  []w/US   []w/ice   []w/heat  Position:  Location:    []  Traction: [] Cervical       []Lumbar                       [] Prone          []Supine                       []Intermittent   []Continuous Lbs:  [] before manual  [] after manual    []  Ultrasound: []Continuous   [] Pulsed                           []1MHz   []3MHz W/cm2:  Location:    []  Iontophoresis with dexamethasone         Location: [] Take home patch   [] In clinic    []  Ice     []  heat  []  Ice massage  []  Laser   []  Anodyne Position:  Location:    []  Laser with stim  []  Other:  Position:  Location:    []  Vasopneumatic Device Pressure:       [] lo [] med [] hi   Temperature: [] lo [] med [] hi   [] Skin assessment post-treatment:  []intact []redness- no adverse reaction    []redness  adverse reaction:     46 min Therapeutic Exercise:  [x] See flow sheet :   Rationale: increase ROM and increase strength to improve the patients ability to improve ease of transfers, stand/amb    14 min Manual Therapy:  Right sidelying STM to Left gluteals/ITB, supine STM to Right gluteals/ITB   The manual therapy interventions were performed at a separate and distinct time from the therapeutic activities interventions. Rationale: decrease pain, increase ROM, increase tissue extensibility and decrease trigger points to improve ease of mobiliy      With   [] TE   [] TA   [] neuro   [] other: Patient Education: [x] Review HEP    [] Progressed/Changed HEP based on:   [] positioning   [] body mechanics   [] transfers   [] heat/ice application    [] other:      Other Objective/Functional Measures: increased repetitions per flow sheet  Patient has ordered home TENS unit from [facility]      Pain Level (0-10 scale) post treatment: 2/10    ASSESSMENT/Changes in Function: Patient reports increased pain in her Left hip with Left LE weight bearing activities, but reports overall less pain/stiffness in her LEs since initiating HEP/therapy. Advised her to incorporate Left SLS at home as able to improve gluteus medius activation. Patient will continue to benefit from skilled PT services to modify and progress therapeutic interventions, address functional mobility deficits, address ROM deficits, address strength deficits, analyze and address soft tissue restrictions, analyze and cue movement patterns, analyze and modify body mechanics/ergonomics, assess and modify postural abnormalities, address imbalance/dizziness and instruct in home and community integration to attain remaining goals. []  See Plan of Care  []  See progress note/recertification  []  See Discharge Summary         Progress towards goals / Updated goals:  Short Term Goals: To be accomplished in 1 weeks:  Goal: Patient will be independent and compliant with HEP in order to progress toward long term goals.   Status at last note/certification: issued and reviewed  Current status: met, 2/9/21  Long Term Goals: To be accomplished in 10 treatments:  Goal: Patient will improve FOTO assessment score to 53 pts in order to indicate improved functional abilities. Status at [facility] note/certification: 27 AVF  Current status: reassess at MD note  Goal: Patient will improve Left hip abduction strength to at least 4+/5 in order to reduce lumbar strain with transfers, standing activities. Status at last note/certification: 3/5  Current status: reassess closer to MD note 2/9/21  Goal: Patient will report no increased low back pain with full supine bridge in order to improve ease of bed mobility. Status at last note/certification: increased pain, bridge to 50% height  Current status: progressing, less pain but bridge to 25% of full 2/19/21  Goal: Patient will report worst low back, hip pain as 5/10 or less in order to improve overall activity tolerance. Status at last note/certification: 05/17  Current status: not met, 10/10 recently at night 2/19/21  Goal: Patient will improve Right lumbar lateral flexion AROM to WNL without increased pain in order to improve ease of ADLs.   Status at last note/certification: 24% with increased pain  Current status: progressing, 75% of full with some discomfort 2/19/21    PLAN  [x]  Upgrade activities as tolerated     [x]  Continue plan of care  []  Update interventions per flow sheet       []  Discharge due to:_  []  Other:_      Chelsie Henderson PT 2/19/2021  2:28 PM    Future Appointments   Date Time Provider Yoseph Trejo   2/19/2021  2:30 PM Jorge Aguilar PT [facility]   2/23/2021  2:45 PM De Baca Mason, PTA [facility]   2/25/2021  2:45 PM Jorge Aguilar PT [facility]   3/1/2021  2:15 PM Jorge Aguilar PT [facility]   3/4/2021  2:45 PM Jorge Aguilar PT [facility]

## 2021-02-23 ENCOUNTER — HOSPITAL ENCOUNTER (OUTPATIENT)
Dept: PHYSICAL THERAPY | Age: 76
Discharge: HOME | End: 2021-02-23
Payer: MEDICARE

## 2021-02-23 PROCEDURE — 97110 THERAPEUTIC EXERCISES: CPT

## 2021-02-23 PROCEDURE — 97140 MANUAL THERAPY 1/> REGIONS: CPT

## 2021-02-23 NOTE — PROGRESS NOTES
PT DAILY TREATMENT NOTE     Patient Name: Janeen Butts  Date:2021  : 1945  [x]  Patient  Verified  Payor: Isaura Palomino / Plan: [place] MEDICARE PART A & B / Product Type: Medicare /    In time:235  Out time:325  Total Treatment Time (min): 50  Visit #: 6 of 10    Medicare/BCBS Only   Total Timed Codes (min):  50 1:1 Treatment Time:  50       Treatment Area: Low back pain [M54.5]    SUBJECTIVE  Pain Level (0-10 scale): 2/10  Any medication changes, allergies to medications, adverse drug reactions, diagnosis change, or new procedure performed?: [x] No    [] Yes (see summary sheet for update)  Subjective functional status/changes:   [] No changes reported  Not bad today. Not as bad as I have.      OBJECTIVE    Modality rationale:    Min Type Additional Details    [] Estim:  []Unatt       []IFC  []Premod                        []Other:  []w/ice   []w/heat  Position:  Location:    [] Estim: []Att    []TENS instruct  []NMES                    []Other:  []w/US   []w/ice   []w/heat  Position:  Location:    []  Traction: [] Cervical       []Lumbar                       [] Prone          []Supine                       []Intermittent   []Continuous Lbs:  [] before manual  [] after manual    []  Ultrasound: []Continuous   [] Pulsed                           []1MHz   []3MHz W/cm2:  Location:    []  Iontophoresis with dexamethasone         Location: [] Take home patch   [] In clinic    []  Ice     []  heat  []  Ice massage  []  Laser   []  Anodyne Position:  Location:    []  Laser with stim  []  Other:  Position:  Location:    []  Vasopneumatic Device Pressure:       [] lo [] med [] hi   Temperature: [] lo [] med [] hi   [] Skin assessment post-treatment:  []intact []redness- no adverse reaction    []redness  adverse reaction:     37 min Therapeutic Exercise:  [x] See flow sheet :   Rationale: increase ROM and increase strength to improve the patients ability to improve ease of transfers, mobility, standing    13 min Manual Therapy:  Right sidelying STM to Left gluteals/ITB, supine STM to Right gluteals/ITB   The manual therapy interventions were performed at a separate and distinct time from the therapeutic activities interventions. Rationale: decrease pain, increase ROM, increase tissue extensibility and decrease trigger points to improve ease of mobility, functional tasks        With   [] TE   [] TA   [] neuro   [] other: Patient Education: [x] Review HEP    [] Progressed/Changed HEP based on:   [] positioning   [] body mechanics   [] transfers   [] heat/ice application    [] other:      Other Objective/Functional Measures: Patient received home TENS unit and brought it in today for clarification with placement of pads for Left hip pain, discussed appropriate use for recommended no more than 30 minutes at a time due to accomodation   Increased cramping in Left adductors/hamstrings with performing Right LTR with band- gave Patient water and had her walk around in clinic to alleviate cramping    Pain Level (0-10 scale) post treatment: 2/10    ASSESSMENT/Changes in Function: Patient reports slowly improving functional abilities since initiating therapy and HEP. She has a home TENS unit for pain management, and is compliant and safe with use. Left LE remains weaker than Right. Patient will continue to benefit from skilled PT services to modify and progress therapeutic interventions, address functional mobility deficits, address ROM deficits, address strength deficits, analyze and address soft tissue restrictions, analyze and cue movement patterns, analyze and modify body mechanics/ergonomics, assess and modify postural abnormalities, address imbalance/dizziness and instruct in home and community integration to attain remaining goals.      []  See Plan of Care  []  See progress note/recertification  []  See Discharge Summary         Progress towards goals / Updated goals:  Short Term Goals: To be accomplished in 1 weeks:  Goal: Patient will be independent and compliant with HEP in order to progress toward long term goals. Status at last note/certification: issued and reviewed  Current status: met, 2/9/21  Long Term Goals: To be accomplished in 10 treatments:  Goal: Patient will improve FOTO assessment score to 53 pts in order to indicate improved functional abilities. Status at lats note/certification: 67 URX  Current status: reassess at MD note  Goal: Patient will improve Left hip abduction strength to at least 4+/5 in order to reduce lumbar strain with transfers, standing activities. Status at last note/certification: 3/5  Current status: reassess closer to MD note 2/9/21  Goal: Patient will report no increased low back pain with full supine bridge in order to improve ease of bed mobility. Status at last note/certification: increased pain, bridge to 50% height  Current status: progressing, less pain but bridge to 25% of full 2/19/21  Goal: Patient will report worst low back, hip pain as 5/10 or less in order to improve overall activity tolerance. Status at last note/certification: 69/47  Current status: not met, 10/10 recently at night 2/19/21  Goal: Patient will improve Right lumbar lateral flexion AROM to WNL without increased pain in order to improve ease of ADLs.   Status at last note/certification: 38% with increased pain  Current status: progressing, 75% of full with some discomfort 2/19/21    PLAN  [x]  Upgrade activities as tolerated     [x]  Continue plan of care  []  Update interventions per flow sheet       []  Discharge due to:_  []  Other:_      Renaldo Goldberg, PT 2/23/2021  2:33 PM    Future Appointments   Date Time Provider Yoseph Trejo   2/23/2021  2:45 PM Bonniea Coe [facility]   2/25/2021  2:45 PM Bonniea Coe [facility]   3/1/2021  2:15 PM Bonniea Coe [facility]   3/4/2021  2:45 PM Carolyn Serna PT [facility]

## 2021-02-25 ENCOUNTER — HOSPITAL ENCOUNTER (OUTPATIENT)
Dept: PHYSICAL THERAPY | Age: 76
Discharge: HOME | End: 2021-02-25
Payer: MEDICARE

## 2021-02-25 PROCEDURE — 97140 MANUAL THERAPY 1/> REGIONS: CPT

## 2021-02-25 PROCEDURE — 97530 THERAPEUTIC ACTIVITIES: CPT

## 2021-02-25 PROCEDURE — 97110 THERAPEUTIC EXERCISES: CPT

## 2021-02-25 PROCEDURE — 97112 NEUROMUSCULAR REEDUCATION: CPT

## 2021-02-25 NOTE — PROGRESS NOTES
PT DAILY TREATMENT NOTE     Patient Name: Norberto Guerrero  Date:2021  : 1945  [x]  Patient  Verified  Payor: Carley De La Cruz / Plan: [place] & [place] / Product Type: Medicare /    In time:242  Out time:340  Total Treatment Time (min): 58  Visit #: 7 of 10    Medicare/BCBS Only   Total Timed Codes (min):  58 1:1 Treatment Time:  58       Treatment Area: Low back pain [M54.5]    SUBJECTIVE  Pain Level (0-10 scale): 3/10  Any medication changes, allergies to medications, adverse drug reactions, diagnosis change, or new procedure performed?: [x] No    [] Yes (see summary sheet for update)  Subjective functional status/changes:   [] No changes reported  I went to the doctor yesterday. He wants me to go through an MRI and a bone density scan.      OBJECTIVE    Modality rationale: Patient declined   Min Type Additional Details    [] Estim:  []Unatt       []IFC  []Premod                        []Other:  []w/ice   []w/heat  Position:  Location:    [] Estim: []Att    []TENS instruct  []NMES                    []Other:  []w/US   []w/ice   []w/heat  Position:  Location:    []  Traction: [] Cervical       []Lumbar                       [] Prone          []Supine                       []Intermittent   []Continuous Lbs:  [] before manual  [] after manual    []  Ultrasound: []Continuous   [] Pulsed                           []1MHz   []3MHz W/cm2:  Location:    []  Iontophoresis with dexamethasone         Location: [] Take home patch   [] In clinic    []  Ice     []  heat  []  Ice massage  []  Laser   []  Anodyne Position:  Location:    []  Laser with stim  []  Other:  Position:  Location:    []  Vasopneumatic Device Pressure:       [] lo [] med [] hi   Temperature: [] lo [] med [] hi   [] Skin assessment post-treatment:  []intact []redness- no adverse reaction    []redness  adverse reaction:     23 min Therapeutic Exercise:  [x] See flow sheet :   Rationale: increase ROM and increase strength to improve the patients ability to perform ADLs, improve ease of household tasks    10 min Therapeutic Activity:  [x]  See flow sheet :   Rationale: increase strength, improve coordination and increase proprioception  to improve the patients ability to improve stability with squatting, stairs      10 min Neuromuscular Re-education:  [x]  See flow sheet :   Rationale: increase strength, improve coordination, improve balance and increase proprioception  to improve the patients ability to improve core, gluteal activation in order to reduce lumbar strain     15 min Manual Therapy:  Bilateral sidelying STM/TPR to bilateral gluteals/piriformis    The manual therapy interventions were performed at a separate and distinct time from the therapeutic activities interventions. Rationale: decrease pain, increase ROM, increase tissue extensibility and decrease trigger points to improve ease of mobility        With   [] TE   [] TA   [] neuro   [] other: Patient Education: [x] Review HEP    [] Progressed/Changed HEP based on:   [] positioning   [] body mechanics   [] transfers   [] heat/ice application    [] other:      Other Objective/Functional Measures: Patient awaiting MD office to call her to schedule MRI, bone density scan  Added supine Paloff press, swiss ball roll up for improved core activation      Pain Level (0-10 scale) post treatment: 2/10    ASSESSMENT/Changes in Function: Patient reports decreasing pain levels since initiating therapy. Her worst pain is in the mornings when she gets up, but after moving around pain does dissipate. At this time Patient would like to continue with therapy for a few more weeks due to continued pain and lingering weakness, and we will plan to do a re-certification next visit.      Patient will continue to benefit from skilled PT services to modify and progress therapeutic interventions, address functional mobility deficits, address ROM deficits, address strength deficits, analyze and address soft tissue restrictions, analyze and cue movement patterns, analyze and modify body mechanics/ergonomics, assess and modify postural abnormalities, address imbalance/dizziness and instruct in home and community integration to attain remaining goals. []  See Plan of Care  []  See progress note/recertification  []  See Discharge Summary         Progress towards goals / Updated goals:  Short Term Goals: To be accomplished in 1 weeks:  Goal: Patient will be independent and compliant with HEP in order to progress toward long term goals. Status at last note/certification: issued and reviewed  Current status: met, 2/9/21  Long Term Goals: To be accomplished in 10 treatments:  Goal: Patient will improve FOTO assessment score to 53 pts in order to indicate improved functional abilities. Status at lats note/certification: 12 OFI  Current status: reassess at MD note  Goal: Patient will improve Left hip abduction strength to at least 4+/5 in order to reduce lumbar strain with transfers, standing activities. Status at last note/certification: 3/5  Current status: progressing, able to progress hip abduction strengthening exercises 2/25/21  Goal: Patient will report no increased low back pain with full supine bridge in order to improve ease of bed mobility. Status at last note/certification: increased pain, bridge to 50% height  Current status: progressing, 50% 2/25//21  Goal: Patient will report worst low back, hip pain as 5/10 or less in order to improve overall activity tolerance. Status at last note/certification: 41/13  Current status: progressing, 7/10 (mornings) 2/25/21  Goal: Patient will improve Right lumbar lateral flexion AROM to WNL without increased pain in order to improve ease of ADLs.   Status at last note/certification: 96% with increased pain  Current status: progressing, remains about 75% of full with decreasing discomfort 2/25/21    PLAN  [x]  Upgrade activities as tolerated     [x]  Continue plan of care  []  Update interventions per flow sheet       []  Discharge due to:_  []  Other:_      Sami Patient, PT 2/25/2021  2:40 PM    Future Appointments   Date Time Provider Yoseph Terjo   2/25/2021  2:45 PM Arland Clines [facility]   3/1/2021  2:15 PM Beverly Guadalupe, PT [facility]   3/4/2021  2:45 PM Beverly Guadalupe, PT [facility]

## 2021-03-04 ENCOUNTER — APPOINTMENT (OUTPATIENT)
Dept: PHYSICAL THERAPY | Age: 76
End: 2021-03-04

## 2021-03-31 NOTE — PROGRESS NOTES
[facility]in Brody  [address], [place] [address]  [phone] fax    Discharge Summary    Patient name: Quentin Wright Start of Care: 2021   Referral source: Hulda Holstein, MD : 1945   Medical/Treatment Diagnosis: Low back pain [M54.5]  Payor: Shonda Cam / Plan: [place] MEDICARE PART A & B / Product Type: Medicare /  Onset Date:2020     Prior Hospitalization: see medical history Provider#: [id number]   Medications: Verified on Patient Summary List     Comorbidities: heart disease (open heart surgery ), arthritis, HTN, stroke  affecting Right side   Prior Level of Function: Functionally independent, lives with  in single level home, walks on treadmill, enjoys working with stained glass    Visits from Start of Care: 7    Missed Visits: 3    Reporting Period : 21 to 21    Short Term Goals: To be accomplished in 1 weeks:  Goal: Patient will be independent and compliant with HEP in order to progress toward long term goals. Status at last note/certification: issued and reviewed  Current status: met, 21  Long Term Goals: To be accomplished in 10 treatments:  Goal: Patient will improve FOTO assessment score to 53 pts in order to indicate improved functional abilities. Status at lats note/certification: 91 GKH  Current status: reassess at MD note  Goal: Patient will improve Left hip abduction strength to at least 4+/5 in order to reduce lumbar strain with transfers, standing activities. Status at last note/certification: 3/  Current status: progressing, able to progress hip abduction strengthening exercises 21  Goal: Patient will report no increased low back pain with full supine bridge in order to improve ease of bed mobility.   Status at last note/certification: increased pain, bridge to 50% height  Current status: progressing, 50% 21  Goal: Patient will report worst low back, hip pain as 5/10 or less in order to improve overall activity tolerance. Status at last note/certification: 65/87  Current status: progressing, 7/10 (mornings) 2/25/21  Goal: Patient will improve Right lumbar lateral flexion AROM to WNL without increased pain in order to improve ease of ADLs. Status at last note/certification: 46% with increased pain  Current status: progressing, remains about 75% of full with decreasing discomfort 2/25/21      Assessment/ Summary of Care: Patient attended therapy for low back and hip pain, with improvements noted in ROM, strength, and general activity tolerance. While attending therapy Patient did order and receive a home TENS unit, as well as comprehensive HEP. After most recent session Patient canceled remaining visits due to illness, and when contacted to schedule more sessions, she declined due to other things going on. We will discharge. Thank you for the referral of this Patient.      RECOMMENDATIONS:  [x]Discontinue therapy: []Patient has reached or is progressing toward set goals      [x]Patient is non-compliant or has abdicated      []Due to lack of appreciable progress towards set [address] Montenegro, PT 3/31/2021 12:35 PM    Dereck Gee MD

## 2021-08-24 NOTE — TELEPHONE ENCOUNTER
ENT requested to see if the patient could stop her toprol or hold her toprol for allergy testing.      Verbal order and read back per Le Zhang, DO  Ok to hold 24 hours prior to allergy testing
Plan: Will try ILK or oral antibiotics next. Discussed nutrafol with risks and benefits reviewed.
Initiate Treatment: Clobetasol ointment to aa on scalp TIW\\nKetoconazole shampoo to scalp QOW
Otc Regimen: Rogaine 5% to scalp QD
Detail Level: Simple

## 2021-09-30 PROBLEM — I83.11 VARICOSE VEINS OF BOTH LOWER EXTREMITIES WITH INFLAMMATION: Status: ACTIVE | Noted: 2017-06-13

## 2021-09-30 PROBLEM — M79.89 LEG SWELLING: Status: ACTIVE | Noted: 2017-06-13

## 2021-09-30 PROBLEM — I47.29 NSVT (NONSUSTAINED VENTRICULAR TACHYCARDIA): Status: ACTIVE | Noted: 2019-01-15

## 2021-09-30 PROBLEM — I25.5 ISCHEMIC CARDIOMYOPATHY: Status: ACTIVE | Noted: 2019-01-15

## 2021-09-30 PROBLEM — I89.0 LYMPHEDEMA OF BOTH LOWER EXTREMITIES: Status: ACTIVE | Noted: 2018-04-04

## 2021-09-30 PROBLEM — Q82.0 HEREDITARY LYMPHEDEMA: Status: ACTIVE | Noted: 2018-06-05

## 2021-09-30 PROBLEM — I83.12 VARICOSE VEINS OF BOTH LOWER EXTREMITIES WITH INFLAMMATION: Status: ACTIVE | Noted: 2017-06-13

## 2021-09-30 PROBLEM — I10 ESSENTIAL HYPERTENSION: Status: ACTIVE | Noted: 2019-04-02

## 2021-09-30 PROBLEM — Z99.89 OSA ON CPAP: Status: ACTIVE | Noted: 2019-05-07

## 2021-09-30 PROBLEM — G47.33 OSA ON CPAP: Status: ACTIVE | Noted: 2019-05-07

## 2021-09-30 PROBLEM — I83.93 SPIDER VEINS OF BOTH LOWER EXTREMITIES: Status: ACTIVE | Noted: 2017-06-13

## 2022-01-31 NOTE — PROGRESS NOTES
Patient showing New LBBB on EKG. Per Dr. HELM test wouldn't be diagnostic.test canceled. Different test ordered.
Unable to reach to confirm appointment.
31-Jan-2022 15:21

## 2022-03-18 PROBLEM — R07.9 CHEST PAIN: Status: ACTIVE | Noted: 2018-04-04

## 2022-03-18 PROBLEM — G47.33 OSA ON CPAP: Status: ACTIVE | Noted: 2019-05-07

## 2022-03-18 PROBLEM — I25.10 CORONARY ARTERIOSCLEROSIS: Status: ACTIVE | Noted: 2020-05-10

## 2022-03-18 PROBLEM — Z99.89 OSA ON CPAP: Status: ACTIVE | Noted: 2019-05-07

## 2022-03-18 PROBLEM — I83.93 SPIDER VEINS OF BOTH LOWER EXTREMITIES: Status: ACTIVE | Noted: 2017-06-13

## 2022-03-19 PROBLEM — I25.5 ISCHEMIC CARDIOMYOPATHY: Status: ACTIVE | Noted: 2019-01-15

## 2022-03-19 PROBLEM — Q82.0 HEREDITARY LYMPHEDEMA: Status: ACTIVE | Noted: 2018-06-05

## 2022-03-19 PROBLEM — I47.29 NSVT (NONSUSTAINED VENTRICULAR TACHYCARDIA) (HCC): Status: ACTIVE | Noted: 2019-01-15

## 2022-03-19 PROBLEM — I89.0 LYMPHEDEMA OF BOTH LOWER EXTREMITIES: Status: ACTIVE | Noted: 2018-04-04

## 2022-03-19 PROBLEM — M79.89 LEG SWELLING: Status: ACTIVE | Noted: 2017-06-13

## 2022-03-19 PROBLEM — I10 ESSENTIAL HYPERTENSION: Status: ACTIVE | Noted: 2019-04-02

## 2022-03-20 PROBLEM — I83.12 VARICOSE VEINS OF BOTH LOWER EXTREMITIES WITH INFLAMMATION: Status: ACTIVE | Noted: 2017-06-13

## 2022-03-20 PROBLEM — I83.11 VARICOSE VEINS OF BOTH LOWER EXTREMITIES WITH INFLAMMATION: Status: ACTIVE | Noted: 2017-06-13

## 2022-03-20 PROBLEM — E66.01 SEVERE OBESITY (BMI 35.0-39.9) WITH COMORBIDITY (HCC): Status: ACTIVE | Noted: 2018-04-26

## 2022-04-21 ENCOUNTER — HOSPITAL ENCOUNTER (OUTPATIENT)
Dept: LAB | Age: 77
End: 2022-04-21
Payer: MEDICARE

## 2022-04-21 ENCOUNTER — HOSPITAL ENCOUNTER (OUTPATIENT)
Dept: GENERAL RADIOLOGY | Age: 77
Discharge: HOME | End: 2022-04-21
Payer: MEDICARE

## 2022-04-21 DIAGNOSIS — M25.519 PAIN IN UNSPECIFIED SHOULDER: ICD-10-CM

## 2022-04-21 DIAGNOSIS — M54.6 PAIN IN THORACIC SPINE: ICD-10-CM

## 2022-04-21 PROCEDURE — 73030 X-RAY EXAM OF SHOULDER: CPT

## 2022-04-21 PROCEDURE — 72070 X-RAY EXAM THORAC SPINE 2VWS: CPT

## 2022-05-06 ENCOUNTER — APPOINTMENT (OUTPATIENT)
Dept: PHYSICAL THERAPY | Age: 77
End: 2022-05-06

## 2023-01-31 RX ORDER — AMLODIPINE BESYLATE 2.5 MG/1
TABLET ORAL
COMMUNITY
Start: 2021-05-03

## 2023-01-31 RX ORDER — ONDANSETRON 4 MG/1
4 TABLET, ORALLY DISINTEGRATING ORAL EVERY 8 HOURS PRN
COMMUNITY
Start: 2021-07-14

## 2023-01-31 RX ORDER — WARFARIN SODIUM 2 MG/1
2 TABLET ORAL
COMMUNITY

## 2023-01-31 RX ORDER — FUROSEMIDE 20 MG/1
20 TABLET ORAL DAILY
COMMUNITY

## 2023-01-31 RX ORDER — ATORVASTATIN CALCIUM 40 MG/1
40 TABLET, FILM COATED ORAL DAILY
COMMUNITY

## 2023-01-31 RX ORDER — NITROGLYCERIN 0.4 MG/1
0.4 TABLET SUBLINGUAL
COMMUNITY
Start: 2019-07-08

## 2023-01-31 RX ORDER — TIZANIDINE 2 MG/1
TABLET ORAL
COMMUNITY

## 2023-01-31 RX ORDER — OMEPRAZOLE 20 MG/1
20 TABLET, DELAYED RELEASE ORAL DAILY
COMMUNITY

## 2023-01-31 RX ORDER — LISINOPRIL 10 MG/1
10 TABLET ORAL DAILY
COMMUNITY
Start: 2018-04-12

## 2023-01-31 RX ORDER — OMEPRAZOLE 20 MG/1
20 CAPSULE, DELAYED RELEASE ORAL DAILY
COMMUNITY

## 2025-03-26 NOTE — PROGRESS NOTES
1. Have you been to the ER, urgent care clinic since your last visit? Hospitalized since your last visit? Yes When: 4/6/18 Where: [facility] Reason for visit: Heart racing, BP issues    2. Have you seen or consulted any other health care providers outside of the [address] [place] [address] since your last visit? Include any pap smears or colon screening.  No not applicable

## (undated) DEVICE — Device

## (undated) DEVICE — AIRLIFE™ NASAL OXYGEN CANNULA CURVED, NONFLARED TIP WITH 14 FOOT (4.3 M) CRUSH-RESISTANT TUBING, OVER-THE-EAR STYLE: Brand: AIRLIFE™

## (undated) DEVICE — FORCEPS BX L240CM JAW DIA2.8MM L CAP W/ NDL MIC MESH TOOTH

## (undated) DEVICE — FLEX ADVANTAGE 1500CC: Brand: FLEX ADVANTAGE

## (undated) DEVICE — MEDI-VAC NON-CONDUCTIVE SUCTION TUBING: Brand: CARDINAL HEALTH

## (undated) DEVICE — CATH IV SAFE STR 22GX1IN BLU -- PROTECTIV PLUS